# Patient Record
Sex: FEMALE | Race: WHITE | HISPANIC OR LATINO | Employment: UNEMPLOYED | URBAN - METROPOLITAN AREA
[De-identification: names, ages, dates, MRNs, and addresses within clinical notes are randomized per-mention and may not be internally consistent; named-entity substitution may affect disease eponyms.]

---

## 2018-09-12 ENCOUNTER — LAB VISIT (OUTPATIENT)
Dept: LAB | Facility: HOSPITAL | Age: 33
End: 2018-09-12
Attending: OBSTETRICS & GYNECOLOGY
Payer: MEDICAID

## 2018-09-12 ENCOUNTER — OFFICE VISIT (OUTPATIENT)
Dept: OBSTETRICS AND GYNECOLOGY | Facility: CLINIC | Age: 33
End: 2018-09-12
Payer: MEDICAID

## 2018-09-12 VITALS
WEIGHT: 143 LBS | RESPIRATION RATE: 10 BRPM | DIASTOLIC BLOOD PRESSURE: 78 MMHG | BODY MASS INDEX: 27 KG/M2 | HEART RATE: 88 BPM | SYSTOLIC BLOOD PRESSURE: 120 MMHG | HEIGHT: 61 IN

## 2018-09-12 DIAGNOSIS — Z32.01 POSITIVE PREGNANCY TEST: ICD-10-CM

## 2018-09-12 DIAGNOSIS — N91.2 AMENORRHEA: Primary | ICD-10-CM

## 2018-09-12 DIAGNOSIS — Z11.3 SCREEN FOR STD (SEXUALLY TRANSMITTED DISEASE): ICD-10-CM

## 2018-09-12 DIAGNOSIS — Z12.4 CERVICAL CANCER SCREENING: ICD-10-CM

## 2018-09-12 LAB
ABO + RH BLD: NORMAL
ALBUMIN SERPL BCP-MCNC: 3.6 G/DL
ALP SERPL-CCNC: 85 U/L
ALT SERPL W/O P-5'-P-CCNC: 28 U/L
ANION GAP SERPL CALC-SCNC: 10 MMOL/L
AST SERPL-CCNC: 27 U/L
BASOPHILS # BLD AUTO: 0.02 K/UL
BASOPHILS NFR BLD: 0.2 %
BILIRUB SERPL-MCNC: 0.2 MG/DL
BLD GP AB SCN CELLS X3 SERPL QL: NORMAL
BUN SERPL-MCNC: 4 MG/DL
CALCIUM SERPL-MCNC: 9.3 MG/DL
CHLORIDE SERPL-SCNC: 106 MMOL/L
CO2 SERPL-SCNC: 21 MMOL/L
CREAT SERPL-MCNC: 0.7 MG/DL
DIFFERENTIAL METHOD: ABNORMAL
EOSINOPHIL # BLD AUTO: 0.2 K/UL
EOSINOPHIL NFR BLD: 2.1 %
ERYTHROCYTE [DISTWIDTH] IN BLOOD BY AUTOMATED COUNT: 12.1 %
EST. GFR  (AFRICAN AMERICAN): >60 ML/MIN/1.73 M^2
EST. GFR  (NON AFRICAN AMERICAN): >60 ML/MIN/1.73 M^2
GLUCOSE SERPL-MCNC: 105 MG/DL
HCT VFR BLD AUTO: 36 %
HGB BLD-MCNC: 12.4 G/DL
LYMPHOCYTES # BLD AUTO: 2.5 K/UL
LYMPHOCYTES NFR BLD: 25.1 %
MCH RBC QN AUTO: 30 PG
MCHC RBC AUTO-ENTMCNC: 34.4 G/DL
MCV RBC AUTO: 87 FL
MONOCYTES # BLD AUTO: 0.8 K/UL
MONOCYTES NFR BLD: 8.2 %
NEUTROPHILS # BLD AUTO: 6.4 K/UL
NEUTROPHILS NFR BLD: 64.4 %
PLATELET # BLD AUTO: 287 K/UL
PMV BLD AUTO: 9.6 FL
POTASSIUM SERPL-SCNC: 3.3 MMOL/L
PROT SERPL-MCNC: 7.5 G/DL
RBC # BLD AUTO: 4.13 M/UL
SODIUM SERPL-SCNC: 137 MMOL/L
WBC # BLD AUTO: 9.86 K/UL

## 2018-09-12 PROCEDURE — 99203 OFFICE O/P NEW LOW 30 MIN: CPT | Mod: PBBFAC,TH | Performed by: OBSTETRICS & GYNECOLOGY

## 2018-09-12 PROCEDURE — 87340 HEPATITIS B SURFACE AG IA: CPT

## 2018-09-12 PROCEDURE — 87491 CHLMYD TRACH DNA AMP PROBE: CPT

## 2018-09-12 PROCEDURE — 87624 HPV HI-RISK TYP POOLED RSLT: CPT | Mod: 59

## 2018-09-12 PROCEDURE — 81220 CFTR GENE COM VARIANTS: CPT

## 2018-09-12 PROCEDURE — 86592 SYPHILIS TEST NON-TREP QUAL: CPT

## 2018-09-12 PROCEDURE — 99999 PR PBB SHADOW E&M-NEW PATIENT-LVL III: CPT | Mod: PBBFAC,,, | Performed by: OBSTETRICS & GYNECOLOGY

## 2018-09-12 PROCEDURE — 85025 COMPLETE CBC W/AUTO DIFF WBC: CPT

## 2018-09-12 PROCEDURE — 80053 COMPREHEN METABOLIC PANEL: CPT

## 2018-09-12 PROCEDURE — 88175 CYTOPATH C/V AUTO FLUID REDO: CPT

## 2018-09-12 PROCEDURE — 36415 COLL VENOUS BLD VENIPUNCTURE: CPT

## 2018-09-12 PROCEDURE — 86762 RUBELLA ANTIBODY: CPT

## 2018-09-12 PROCEDURE — 86901 BLOOD TYPING SEROLOGIC RH(D): CPT

## 2018-09-12 PROCEDURE — 86703 HIV-1/HIV-2 1 RESULT ANTBDY: CPT

## 2018-09-12 PROCEDURE — 99204 OFFICE O/P NEW MOD 45 MIN: CPT | Mod: 25,S$PBB,TH, | Performed by: OBSTETRICS & GYNECOLOGY

## 2018-09-12 NOTE — PROGRESS NOTES
Subjective:   Patient ID: Royer Mars is a 33 y.o. y.o. female.     Chief Complaint: Missed Menses       History of Present Illness:    Royer presents today complaining of amenorrhea. No LMP recorded.. Prior menstrual cycles have been regular. She reports positive home pregnancy test. UPT is positive.     Patient reports LMP mid May. No n/v. No cramping or vaginal bleeding. No FM perceived yet. She has had two prior uncomplicated SVDs.     History reviewed. No pertinent past medical history.  Past Surgical History:   Procedure Laterality Date    HERNIA REPAIR       Social History     Socioeconomic History    Marital status:      Spouse name: None    Number of children: None    Years of education: None    Highest education level: None   Social Needs    Financial resource strain: None    Food insecurity - worry: None    Food insecurity - inability: None    Transportation needs - medical: None    Transportation needs - non-medical: None   Occupational History    None   Tobacco Use    Smoking status: Never Smoker    Smokeless tobacco: Never Used   Substance and Sexual Activity    Alcohol use: No    Drug use: No    Sexual activity: None   Other Topics Concern    None   Social History Narrative    None     Family History   Problem Relation Age of Onset    Diabetes Mother     Diabetes Father     Heart disease Father     Hypertension Father      OB History    Para Term  AB Living   3 2 2     2   SAB TAB Ectopic Multiple Live Births           2      # Outcome Date GA Lbr Christopher/2nd Weight Sex Delivery Anes PTL Lv   3 Term  40w0d  3.402 kg (7 lb 8 oz) F Vag-Spont   RIGOBERTO   2 Term  40w0d  3.345 kg (7 lb 6 oz) F Vag-Spont   RIGOBERTO   1                      ROS:   Constitutional: Negative for chills, fever and weight loss.   HENT: Negative for congestion, ear discharge, hearing loss and nosebleeds.   Eyes: Negative for blurred vision and double vision.   Respiratory:  Negative for cough, hemoptysis, sputum production and shortness of breath.   Cardiovascular: Negative for chest pain, palpitations and orthopnea.   Gastrointestinal: Negative for abdominal pain, heartburn, nausea and vomiting.   Genitourinary: Negative for dysuria, frequency, hematuria and urgency.   Musculoskeletal: Negative for back pain, myalgias and neck pain.   Skin: Negative for itching and rash.   Neurological: Negative for dizziness, tingling, tremors and headaches.   Endo/Heme/Allergies: Negative for environmental allergies and polydipsia. Does not bruise/bleed easily.   Psychiatric/Behavioral: Negative for depression, hallucinations and substance abuse. The patient is not nervous/anxious.       Objective:   Vital Signs:  Vitals:    09/12/18 1611   BP: 120/78   Pulse: 88   Resp: 10         Physical Exam:  General:  alert, cooperative, appears stated age   Skin:  Skin color, texture, turgor normal. No rashes or lesions   HEENT:  conjunctivae/corneas clear. PERRL.   Neck: supple, trachea midline, no adenopathy or thyromegally   Respiratory:  clear to auscultation bilaterally   Heart:  regular rate and rhythm, S1, S2 normal, no murmur, click, rub or gallop   Breasts:  no discharge, erythema, or tenderness   Abdomen:  normal findings: bowel sounds normal, no masses palpable, no organomegaly and soft, non-tender   Pelvis: External genitalia: normal general appearance  Urinary system: urethral meatus normal, bladder nontender  Vaginal: normal mucosa without prolapse or lesions  Cervix: normal appearance  Uterus: normal size, shape, position  Adnexa: normal size, nontender bilaterally   Extremities: Normal ROM; no edema, no cyanosis   Neurologial: Normal strength and tone. No focal numbness or weakness. Reflexes 2+ and equal.   Psychiatric: normal mood, speech, dress, and thought processes     Assessment:      1. Amenorrhea    2. Positive pregnancy test    3. Cervical cancer screening    4. Screen for STD  (sexually transmitted disease)          Plan:      Amenorrhea  -     US OB/GYN Procedure ([facility]) - Extended List - Future; Future    Positive pregnancy test  -     Comprehensive metabolic panel; Future; Expected date: 09/12/2018  -     Cystic Fibrosis Mutation Panel; Future; Expected date: 09/12/2018  -     RPR; Future; Expected date: 09/12/2018  -     Rubella antibody, IgG; Future; Expected date: 09/12/2018  -     Hepatitis B surface antigen; Future; Expected date: 09/12/2018  -     HIV-1 and HIV-2 antibodies; Future; Expected date: 09/12/2018  -     CBC auto differential; Future; Expected date: 09/12/2018  -     Type & Screen - Ob Profile; Future; Expected date: 09/12/2018    Cervical cancer screening  -     HPV High Risk Genotypes, PCR  -     Liquid-based pap smear, screening    Screen for STD (sexually transmitted disease)  -     C. trachomatis/N. gonorrhoeae by AMP DNA      Dating scan scheduled  Pap  HPV cotesting  PNV  Labs  RTC in 4 weeks.     Patient was counseled today on proper weight gain based on the [place] [employer]'s recommendations based on her pre-pregnancy weight. Discussed foods to avoid in pregnancy (i.e. sushi, fish that are high in mercury, deli meat, and unpasteurized cheeses). Discussed prenatal vitamin options (i.e. stool softener, DHA). She was also counseled on safe, healthy behavior as well as medications safe in pregnancy.

## 2018-09-13 ENCOUNTER — PROCEDURE VISIT (OUTPATIENT)
Dept: OBSTETRICS AND GYNECOLOGY | Facility: CLINIC | Age: 33
End: 2018-09-13
Payer: MEDICAID

## 2018-09-13 DIAGNOSIS — N91.2 AMENORRHEA: ICD-10-CM

## 2018-09-13 LAB
HBV SURFACE AG SERPL QL IA: NEGATIVE
HIV 1+2 AB+HIV1 P24 AG SERPL QL IA: NEGATIVE
RUBV IGG SER-ACNC: 61.7 IU/ML
RUBV IGG SER-IMP: REACTIVE

## 2018-09-13 PROCEDURE — 76815 OB US LIMITED FETUS(S): CPT | Mod: PBBFAC | Performed by: OBSTETRICS & GYNECOLOGY

## 2018-09-13 PROCEDURE — 76815 OB US LIMITED FETUS(S): CPT | Mod: 26,S$PBB,, | Performed by: OBSTETRICS & GYNECOLOGY

## 2018-09-14 LAB — RPR SER QL: NORMAL

## 2018-09-16 LAB
C TRACH DNA SPEC QL NAA+PROBE: NOT DETECTED
N GONORRHOEA DNA SPEC QL NAA+PROBE: NOT DETECTED

## 2018-09-17 LAB — CFTR MUT ANL BLD/T: NORMAL

## 2018-09-18 LAB
HPV HR 12 DNA CVX QL NAA+PROBE: NEGATIVE
HPV16 AG SPEC QL: NEGATIVE
HPV18 DNA SPEC QL NAA+PROBE: NEGATIVE

## 2018-10-04 ENCOUNTER — INITIAL PRENATAL (OUTPATIENT)
Dept: OBSTETRICS AND GYNECOLOGY | Facility: CLINIC | Age: 33
End: 2018-10-04
Payer: MEDICAID

## 2018-10-04 VITALS
SYSTOLIC BLOOD PRESSURE: 120 MMHG | DIASTOLIC BLOOD PRESSURE: 80 MMHG | WEIGHT: 144 LBS | BODY MASS INDEX: 27.21 KG/M2 | HEART RATE: 80 BPM

## 2018-10-04 DIAGNOSIS — Z3A.17 17 WEEKS GESTATION OF PREGNANCY: ICD-10-CM

## 2018-10-04 DIAGNOSIS — Z34.02 SUPERVISION OF NORMAL FIRST PREGNANCY IN SECOND TRIMESTER: Primary | ICD-10-CM

## 2018-10-04 DIAGNOSIS — Z36.3 ENCOUNTER FOR ROUTINE SCREENING FOR MALFORMATION USING ULTRASONICS: ICD-10-CM

## 2018-10-04 PROCEDURE — 99999 PR PBB SHADOW E&M-EST. PATIENT-LVL II: CPT | Mod: PBBFAC,,, | Performed by: OBSTETRICS & GYNECOLOGY

## 2018-10-04 PROCEDURE — 99213 OFFICE O/P EST LOW 20 MIN: CPT | Mod: TH,S$PBB,, | Performed by: OBSTETRICS & GYNECOLOGY

## 2018-10-04 PROCEDURE — 99212 OFFICE O/P EST SF 10 MIN: CPT | Mod: PBBFAC,TH | Performed by: OBSTETRICS & GYNECOLOGY

## 2018-10-04 RX ORDER — ONDANSETRON 4 MG/1
4 TABLET, FILM COATED ORAL EVERY 6 HOURS PRN
Qty: 30 TABLET | Refills: 0 | Status: SHIPPED | OUTPATIENT
Start: 2018-10-04 | End: 2019-10-04

## 2018-10-04 NOTE — PROGRESS NOTES
Reviewed prenatal labs with patient. Reviewed dating criteria. Discussed proper nutrition and weight gain in pregnancy. No vaginal bleeding or cramping noted. SAB precautions discussed.     Patient doing well. No vaginal bleeding or cramping noted. Discussed the need for an anatomy scan between 18-20 weeks. Discussed quad screen; declines. RTC in 2 weeks.      Coffective counseling sheet Get Ready discussed with mother. Reinforced avoiding induction of labor unless medically indicated as well as comfort measures during labor.  Encouraged mother to download Coffective mobile huber if she has not already done so. Mother verbalizes understanding.    Initial ob packet supplied to patient. Contents supplied and discussed include medications safe in pregnancy, pregnancy A to Z, class schedule, pregnancy checklist, car seat safety, and handout on skin to skin and breastfeeding. Coeffective huber card supplied and discussed.

## 2018-10-25 ENCOUNTER — TELEPHONE (OUTPATIENT)
Dept: OBSTETRICS AND GYNECOLOGY | Facility: CLINIC | Age: 33
End: 2018-10-25

## 2018-10-25 NOTE — TELEPHONE ENCOUNTER
----- Message from Slime Campos sent at 10/25/2018  9:19 AM CDT -----  Contact: Kristopher ()  Royer Mars  MRN: [id number]  Home Phone  [phone]  Work Phone  Not on file.  Mobile  [phone]    Patient Care Team:  Patricia Castle NP as PCP - General (Family Medicine)  OB? Yes, 20w5d  What phone number can you be reached at? [phone]  Message: Pt missed appt for ultrasound and MD appt. The pt daughter was ill. Pt would earlier appt than next available appt. Please return call. Thank you.

## 2018-10-26 ENCOUNTER — ROUTINE PRENATAL (OUTPATIENT)
Dept: OBSTETRICS AND GYNECOLOGY | Facility: CLINIC | Age: 33
End: 2018-10-26
Payer: MEDICAID

## 2018-10-26 ENCOUNTER — PROCEDURE VISIT (OUTPATIENT)
Dept: OBSTETRICS AND GYNECOLOGY | Facility: CLINIC | Age: 33
End: 2018-10-26
Payer: MEDICAID

## 2018-10-26 VITALS
HEART RATE: 80 BPM | DIASTOLIC BLOOD PRESSURE: 70 MMHG | WEIGHT: 145.19 LBS | BODY MASS INDEX: 27.44 KG/M2 | SYSTOLIC BLOOD PRESSURE: 110 MMHG

## 2018-10-26 DIAGNOSIS — Z34.02 SUPERVISION OF NORMAL FIRST PREGNANCY IN SECOND TRIMESTER: Primary | ICD-10-CM

## 2018-10-26 DIAGNOSIS — Z36.3 ENCOUNTER FOR ROUTINE SCREENING FOR MALFORMATION USING ULTRASONICS: ICD-10-CM

## 2018-10-26 DIAGNOSIS — Z3A.20 20 WEEKS GESTATION OF PREGNANCY: ICD-10-CM

## 2018-10-26 PROCEDURE — 76805 OB US >/= 14 WKS SNGL FETUS: CPT | Mod: 26,S$PBB,, | Performed by: OBSTETRICS & GYNECOLOGY

## 2018-10-26 PROCEDURE — 76805 OB US >/= 14 WKS SNGL FETUS: CPT | Mod: PBBFAC | Performed by: OBSTETRICS & GYNECOLOGY

## 2018-10-26 PROCEDURE — 99212 OFFICE O/P EST SF 10 MIN: CPT | Mod: PBBFAC,TH | Performed by: OBSTETRICS & GYNECOLOGY

## 2018-10-26 PROCEDURE — 99999 PR PBB SHADOW E&M-EST. PATIENT-LVL II: CPT | Mod: PBBFAC,,, | Performed by: OBSTETRICS & GYNECOLOGY

## 2018-10-26 PROCEDURE — 99213 OFFICE O/P EST LOW 20 MIN: CPT | Mod: TH,S$PBB,, | Performed by: OBSTETRICS & GYNECOLOGY

## 2018-10-26 NOTE — PROGRESS NOTES
Patient with no complaints. Denies vaginal bleeding or cramping.  Patient declined  quad screen. Anatomy scan reviewed and WNL. RTC in 4 weeks    Coffective counseling sheet Fall In Love discussed with mother. Reinforced immediate skin to skin, the magic first hour, importance of the first feeding and delaying routine procedures. Encouraged mother to download Coffective mobile huber if she has not already done so. Mother verbalizes understanding.

## 2018-11-27 ENCOUNTER — ROUTINE PRENATAL (OUTPATIENT)
Dept: OBSTETRICS AND GYNECOLOGY | Facility: CLINIC | Age: 33
End: 2018-11-27
Payer: MEDICAID

## 2018-11-27 VITALS
BODY MASS INDEX: 27.96 KG/M2 | SYSTOLIC BLOOD PRESSURE: 112 MMHG | WEIGHT: 148 LBS | DIASTOLIC BLOOD PRESSURE: 78 MMHG | HEART RATE: 78 BPM

## 2018-11-27 DIAGNOSIS — Z13.1 ENCOUNTER FOR SCREENING FOR DIABETES MELLITUS: ICD-10-CM

## 2018-11-27 DIAGNOSIS — Z34.02 SUPERVISION OF NORMAL FIRST PREGNANCY IN SECOND TRIMESTER: Primary | ICD-10-CM

## 2018-11-27 DIAGNOSIS — Z3A.25 25 WEEKS GESTATION OF PREGNANCY: ICD-10-CM

## 2018-11-27 PROCEDURE — 99212 OFFICE O/P EST SF 10 MIN: CPT | Mod: PBBFAC,TH | Performed by: OBSTETRICS & GYNECOLOGY

## 2018-11-27 PROCEDURE — 99999 PR PBB SHADOW E&M-EST. PATIENT-LVL II: CPT | Mod: PBBFAC,,, | Performed by: OBSTETRICS & GYNECOLOGY

## 2018-11-27 PROCEDURE — 99213 OFFICE O/P EST LOW 20 MIN: CPT | Mod: TH,S$PBB,, | Performed by: OBSTETRICS & GYNECOLOGY

## 2018-11-27 NOTE — PROGRESS NOTES
Patient with no complaints. Denies vaginal bleeding or contractions. Good FM. GTT/CBC ordered today however patient will have to call to schedule appt.   labor precautions discussed with patient. RTC in 2 weeks.     Coffective counseling sheet Nourish discussed with mother. Reinforced basic breastfeeding position and latch as well as proper hand expression technique and avoidance of artificial nipples and formula unless medically indicated. Encouraged mother to download Coffective mobile huber if she has not already done so.  Mother verbalizes understanding.

## 2018-12-27 ENCOUNTER — LAB VISIT (OUTPATIENT)
Dept: LAB | Facility: HOSPITAL | Age: 33
End: 2018-12-27
Attending: OBSTETRICS & GYNECOLOGY
Payer: MEDICAID

## 2018-12-27 ENCOUNTER — ROUTINE PRENATAL (OUTPATIENT)
Dept: OBSTETRICS AND GYNECOLOGY | Facility: CLINIC | Age: 33
End: 2018-12-27
Payer: MEDICAID

## 2018-12-27 VITALS
DIASTOLIC BLOOD PRESSURE: 70 MMHG | BODY MASS INDEX: 28.91 KG/M2 | WEIGHT: 153 LBS | HEART RATE: 80 BPM | SYSTOLIC BLOOD PRESSURE: 110 MMHG

## 2018-12-27 DIAGNOSIS — Z34.83 ENCOUNTER FOR SUPERVISION OF OTHER NORMAL PREGNANCY IN THIRD TRIMESTER: Primary | ICD-10-CM

## 2018-12-27 DIAGNOSIS — Z3A.29 29 WEEKS GESTATION OF PREGNANCY: ICD-10-CM

## 2018-12-27 DIAGNOSIS — Z13.1 ENCOUNTER FOR SCREENING FOR DIABETES MELLITUS: ICD-10-CM

## 2018-12-27 DIAGNOSIS — Z23 NEED FOR TDAP VACCINATION: ICD-10-CM

## 2018-12-27 DIAGNOSIS — Z34.02 SUPERVISION OF NORMAL FIRST PREGNANCY IN SECOND TRIMESTER: ICD-10-CM

## 2018-12-27 PROBLEM — Z34.03 SUPERVISION OF NORMAL FIRST TEEN PREGNANCY IN THIRD TRIMESTER: Status: ACTIVE | Noted: 2018-10-04

## 2018-12-27 PROBLEM — Z34.93 ENCOUNTER FOR SUPERVISION OF NORMAL PREGNANCY IN THIRD TRIMESTER: Status: ACTIVE | Noted: 2018-12-27

## 2018-12-27 PROBLEM — Z34.03 SUPERVISION OF NORMAL FIRST TEEN PREGNANCY IN THIRD TRIMESTER: Status: RESOLVED | Noted: 2018-10-04 | Resolved: 2018-12-27

## 2018-12-27 LAB
BASOPHILS # BLD AUTO: 0.01 K/UL
BASOPHILS NFR BLD: 0.1 %
DIFFERENTIAL METHOD: ABNORMAL
EOSINOPHIL # BLD AUTO: 0.1 K/UL
EOSINOPHIL NFR BLD: 1.5 %
ERYTHROCYTE [DISTWIDTH] IN BLOOD BY AUTOMATED COUNT: 12.7 %
GLUCOSE SERPL-MCNC: 135 MG/DL
HCT VFR BLD AUTO: 35 %
HGB BLD-MCNC: 11.6 G/DL
LYMPHOCYTES # BLD AUTO: 1.8 K/UL
LYMPHOCYTES NFR BLD: 21.1 %
MCH RBC QN AUTO: 28 PG
MCHC RBC AUTO-ENTMCNC: 33.1 G/DL
MCV RBC AUTO: 85 FL
MONOCYTES # BLD AUTO: 0.6 K/UL
MONOCYTES NFR BLD: 6.5 %
NEUTROPHILS # BLD AUTO: 6.1 K/UL
NEUTROPHILS NFR BLD: 70.8 %
PLATELET # BLD AUTO: 267 K/UL
PMV BLD AUTO: 10.6 FL
RBC # BLD AUTO: 4.14 M/UL
WBC # BLD AUTO: 8.59 K/UL

## 2018-12-27 PROCEDURE — 82947 ASSAY GLUCOSE BLOOD QUANT: CPT

## 2018-12-27 PROCEDURE — 99212 OFFICE O/P EST SF 10 MIN: CPT | Mod: PBBFAC,TH | Performed by: OBSTETRICS & GYNECOLOGY

## 2018-12-27 PROCEDURE — 99999 PR PBB SHADOW E&M-EST. PATIENT-LVL II: CPT | Mod: PBBFAC,,, | Performed by: OBSTETRICS & GYNECOLOGY

## 2018-12-27 PROCEDURE — 36415 COLL VENOUS BLD VENIPUNCTURE: CPT

## 2018-12-27 PROCEDURE — 85025 COMPLETE CBC W/AUTO DIFF WBC: CPT

## 2018-12-27 PROCEDURE — 99213 OFFICE O/P EST LOW 20 MIN: CPT | Mod: TH,S$PBB,, | Performed by: OBSTETRICS & GYNECOLOGY

## 2018-12-27 NOTE — PROGRESS NOTES
Patient with no complaints. Denies vaginal bleeding or contractions. Good FM. Tdap discussed and ordered today.  Discussed fetal kick count instructions with patient to monitor fetal movement. RTC in 2 weeks.    Coffective counseling sheet Keep Baby Close discussed with mother. Reinforced rooming in practices, continued skin to skin, and quiet hours as requested by mother.  Encouraged mother to download Coffective mobile huber if she has not already done so. Mother verbalizes understanding.

## 2019-01-10 ENCOUNTER — ROUTINE PRENATAL (OUTPATIENT)
Dept: OBSTETRICS AND GYNECOLOGY | Facility: CLINIC | Age: 34
End: 2019-01-10
Payer: MEDICAID

## 2019-01-10 VITALS
HEART RATE: 80 BPM | BODY MASS INDEX: 29.29 KG/M2 | DIASTOLIC BLOOD PRESSURE: 80 MMHG | WEIGHT: 155 LBS | SYSTOLIC BLOOD PRESSURE: 110 MMHG

## 2019-01-10 DIAGNOSIS — O99.210 OBESITY IN PREGNANCY: ICD-10-CM

## 2019-01-10 DIAGNOSIS — Z3A.31 31 WEEKS GESTATION OF PREGNANCY: ICD-10-CM

## 2019-01-10 DIAGNOSIS — Z34.83 ENCOUNTER FOR SUPERVISION OF OTHER NORMAL PREGNANCY IN THIRD TRIMESTER: Primary | ICD-10-CM

## 2019-01-10 PROCEDURE — 99213 OFFICE O/P EST LOW 20 MIN: CPT | Mod: TH,S$PBB,, | Performed by: OBSTETRICS & GYNECOLOGY

## 2019-01-10 PROCEDURE — 99999 PR PBB SHADOW E&M-EST. PATIENT-LVL II: CPT | Mod: PBBFAC,,, | Performed by: OBSTETRICS & GYNECOLOGY

## 2019-01-10 PROCEDURE — 99212 OFFICE O/P EST SF 10 MIN: CPT | Mod: PBBFAC,TH | Performed by: OBSTETRICS & GYNECOLOGY

## 2019-01-10 PROCEDURE — 99213 PR OFFICE/OUTPT VISIT, EST, LEVL III, 20-29 MIN: ICD-10-PCS | Mod: TH,S$PBB,, | Performed by: OBSTETRICS & GYNECOLOGY

## 2019-01-10 PROCEDURE — 99999 PR PBB SHADOW E&M-EST. PATIENT-LVL II: ICD-10-PCS | Mod: PBBFAC,,, | Performed by: OBSTETRICS & GYNECOLOGY

## 2019-01-10 NOTE — PROGRESS NOTES
Patient with no complaints. Denies vaginal bleeding or contractions. Good FM. Growth scan ordered for next visit.     Coffective Motivation Document discussed with mother. Reinforced benefits of breastfeeding, risk of formula, and cue based feedings. Encouraged mother to download Composerightective mobile huber if she has not already done so. Mother verbalizes understanding.

## 2019-01-25 ENCOUNTER — ROUTINE PRENATAL (OUTPATIENT)
Dept: OBSTETRICS AND GYNECOLOGY | Facility: CLINIC | Age: 34
End: 2019-01-25
Payer: MEDICAID

## 2019-01-25 VITALS
SYSTOLIC BLOOD PRESSURE: 138 MMHG | WEIGHT: 161.63 LBS | HEART RATE: 82 BPM | BODY MASS INDEX: 30.53 KG/M2 | DIASTOLIC BLOOD PRESSURE: 86 MMHG

## 2019-01-25 DIAGNOSIS — Z34.83 ENCOUNTER FOR SUPERVISION OF OTHER NORMAL PREGNANCY, THIRD TRIMESTER: Primary | ICD-10-CM

## 2019-01-25 DIAGNOSIS — Z3A.33 33 WEEKS GESTATION OF PREGNANCY: ICD-10-CM

## 2019-01-25 PROCEDURE — 99212 OFFICE O/P EST SF 10 MIN: CPT | Mod: PBBFAC,TH | Performed by: OBSTETRICS & GYNECOLOGY

## 2019-01-25 PROCEDURE — 99213 OFFICE O/P EST LOW 20 MIN: CPT | Mod: TH,S$PBB,, | Performed by: OBSTETRICS & GYNECOLOGY

## 2019-01-25 PROCEDURE — 99999 PR PBB SHADOW E&M-EST. PATIENT-LVL II: CPT | Mod: PBBFAC,,, | Performed by: OBSTETRICS & GYNECOLOGY

## 2019-01-25 PROCEDURE — 99213 PR OFFICE/OUTPT VISIT, EST, LEVL III, 20-29 MIN: ICD-10-PCS | Mod: TH,S$PBB,, | Performed by: OBSTETRICS & GYNECOLOGY

## 2019-01-25 PROCEDURE — 99999 PR PBB SHADOW E&M-EST. PATIENT-LVL II: ICD-10-PCS | Mod: PBBFAC,,, | Performed by: OBSTETRICS & GYNECOLOGY

## 2019-01-25 NOTE — PROGRESS NOTES
Patient with no complaints. Denies vaginal bleeding or contractions. Good FM. Growth scan declined at this time by patient.     Coffective counseling sheet Build Your Team discussed with mother. Reinforced importance of early identification of support team including champion, OB provider, pediatrician and local community resources. Encouraged mother to download Coffective mobile huber if she has not already done so.  Mother verbalizes understanding. Also discussed quiet time and delayed bathing.

## 2019-02-08 ENCOUNTER — ROUTINE PRENATAL (OUTPATIENT)
Dept: OBSTETRICS AND GYNECOLOGY | Facility: CLINIC | Age: 34
End: 2019-02-08
Payer: MEDICAID

## 2019-02-08 VITALS
HEART RATE: 76 BPM | WEIGHT: 160.38 LBS | BODY MASS INDEX: 30.31 KG/M2 | SYSTOLIC BLOOD PRESSURE: 122 MMHG | DIASTOLIC BLOOD PRESSURE: 76 MMHG

## 2019-02-08 DIAGNOSIS — Z34.83 ENCOUNTER FOR SUPERVISION OF OTHER NORMAL PREGNANCY, THIRD TRIMESTER: Primary | ICD-10-CM

## 2019-02-08 DIAGNOSIS — Z3A.35 35 WEEKS GESTATION OF PREGNANCY: ICD-10-CM

## 2019-02-08 PROCEDURE — 99213 OFFICE O/P EST LOW 20 MIN: CPT | Mod: PBBFAC,TH | Performed by: OBSTETRICS & GYNECOLOGY

## 2019-02-08 PROCEDURE — 87184 SC STD DISK METHOD PER PLATE: CPT

## 2019-02-08 PROCEDURE — 99213 PR OFFICE/OUTPT VISIT, EST, LEVL III, 20-29 MIN: ICD-10-PCS | Mod: TH,S$PBB,, | Performed by: OBSTETRICS & GYNECOLOGY

## 2019-02-08 PROCEDURE — 87147 CULTURE TYPE IMMUNOLOGIC: CPT

## 2019-02-08 PROCEDURE — 87081 CULTURE SCREEN ONLY: CPT

## 2019-02-08 PROCEDURE — 99999 PR PBB SHADOW E&M-EST. PATIENT-LVL III: ICD-10-PCS | Mod: PBBFAC,,, | Performed by: OBSTETRICS & GYNECOLOGY

## 2019-02-08 PROCEDURE — 99999 PR PBB SHADOW E&M-EST. PATIENT-LVL III: CPT | Mod: PBBFAC,,, | Performed by: OBSTETRICS & GYNECOLOGY

## 2019-02-08 PROCEDURE — 99213 OFFICE O/P EST LOW 20 MIN: CPT | Mod: TH,S$PBB,, | Performed by: OBSTETRICS & GYNECOLOGY

## 2019-02-11 LAB — BACTERIA SPEC AEROBE CULT: NORMAL

## 2019-02-13 ENCOUNTER — TELEPHONE (OUTPATIENT)
Dept: OBSTETRICS AND GYNECOLOGY | Facility: CLINIC | Age: 34
End: 2019-02-13

## 2019-02-13 NOTE — TELEPHONE ENCOUNTER
----- Message from Tamiko Moise sent at 2/13/2019  3:09 PM CST -----  Contact: alcon/  Royer Mars  MRN: [id number]  Home Phone      [phone]  Work Phone      Not on file.  Mobile          [phone]    Patient Care Team:  Patricia Castle NP as PCP - General (Family Medicine)  OB? Yes, 36w4d  What phone number can you be reached at? [phone]  Message:  Pt's  is calling in regards to his wifes appt this week. Pt is a routine ob being seen every week. Pt's  states she can't come in anytime this week and I'm not showing any other openings. Please return call.

## 2019-02-28 ENCOUNTER — ANESTHESIA EVENT (OUTPATIENT)
Dept: OBSTETRICS AND GYNECOLOGY | Facility: HOSPITAL | Age: 34
End: 2019-02-28
Payer: MEDICAID

## 2019-02-28 ENCOUNTER — ANESTHESIA (OUTPATIENT)
Dept: OBSTETRICS AND GYNECOLOGY | Facility: HOSPITAL | Age: 34
End: 2019-02-28
Payer: MEDICAID

## 2019-02-28 ENCOUNTER — HOSPITAL ENCOUNTER (INPATIENT)
Facility: HOSPITAL | Age: 34
LOS: 2 days | Discharge: HOME | End: 2019-03-02
Attending: OBSTETRICS & GYNECOLOGY | Admitting: OBSTETRICS & GYNECOLOGY
Payer: MEDICAID

## 2019-02-28 DIAGNOSIS — O14.13 SEVERE PRE-ECLAMPSIA IN THIRD TRIMESTER: Primary | ICD-10-CM

## 2019-02-28 DIAGNOSIS — Z37.9 NORMAL LABOR: ICD-10-CM

## 2019-02-28 PROBLEM — B95.1 POSITIVE GBS TEST: Status: ACTIVE | Noted: 2019-02-28

## 2019-02-28 PROBLEM — O36.8930 MECONIUM IN AMNIOTIC FLUID AFFECTING MANAGEMENT OF MOTHER IN THIRD TRIMESTER: Status: ACTIVE | Noted: 2019-02-28

## 2019-02-28 LAB
ABO + RH BLD: NORMAL
ALBUMIN SERPL BCP-MCNC: 1.7 G/DL
ALBUMIN SERPL BCP-MCNC: 1.7 G/DL
ALP SERPL-CCNC: 640 U/L
ALP SERPL-CCNC: 662 U/L
ALT SERPL W/O P-5'-P-CCNC: 100 U/L
ALT SERPL W/O P-5'-P-CCNC: 92 U/L
ANION GAP SERPL CALC-SCNC: 11 MMOL/L
ANION GAP SERPL CALC-SCNC: 8 MMOL/L
AST SERPL-CCNC: 73 U/L
AST SERPL-CCNC: 77 U/L
BASOPHILS # BLD AUTO: 0.02 K/UL
BASOPHILS # BLD AUTO: 0.03 K/UL
BASOPHILS NFR BLD: 0.2 %
BASOPHILS NFR BLD: 0.3 %
BILIRUB SERPL-MCNC: 0.3 MG/DL
BILIRUB SERPL-MCNC: 0.5 MG/DL
BLD GP AB SCN CELLS X3 SERPL QL: NORMAL
BUN SERPL-MCNC: 8 MG/DL
BUN SERPL-MCNC: 9 MG/DL
CALCIUM SERPL-MCNC: 8.6 MG/DL
CALCIUM SERPL-MCNC: 8.7 MG/DL
CHLORIDE SERPL-SCNC: 105 MMOL/L
CHLORIDE SERPL-SCNC: 107 MMOL/L
CO2 SERPL-SCNC: 19 MMOL/L
CO2 SERPL-SCNC: 22 MMOL/L
CREAT SERPL-MCNC: 0.8 MG/DL
CREAT SERPL-MCNC: 0.9 MG/DL
CREAT UR-MCNC: 9.7 MG/DL
DIFFERENTIAL METHOD: ABNORMAL
DIFFERENTIAL METHOD: ABNORMAL
EOSINOPHIL # BLD AUTO: 0 K/UL
EOSINOPHIL # BLD AUTO: 0.1 K/UL
EOSINOPHIL NFR BLD: 0.2 %
EOSINOPHIL NFR BLD: 1.1 %
ERYTHROCYTE [DISTWIDTH] IN BLOOD BY AUTOMATED COUNT: 16.4 %
ERYTHROCYTE [DISTWIDTH] IN BLOOD BY AUTOMATED COUNT: 16.5 %
EST. GFR  (AFRICAN AMERICAN): >60 ML/MIN/1.73 M^2
EST. GFR  (AFRICAN AMERICAN): >60 ML/MIN/1.73 M^2
EST. GFR  (NON AFRICAN AMERICAN): >60 ML/MIN/1.73 M^2
EST. GFR  (NON AFRICAN AMERICAN): >60 ML/MIN/1.73 M^2
GLUCOSE SERPL-MCNC: 114 MG/DL
GLUCOSE SERPL-MCNC: 127 MG/DL
HCT VFR BLD AUTO: 32.3 %
HCT VFR BLD AUTO: 35.2 %
HGB BLD-MCNC: 10.2 G/DL
HGB BLD-MCNC: 11.1 G/DL
LDH SERPL L TO P-CCNC: 365 U/L
LYMPHOCYTES # BLD AUTO: 1.6 K/UL
LYMPHOCYTES # BLD AUTO: 2.3 K/UL
LYMPHOCYTES NFR BLD: 12.8 %
LYMPHOCYTES NFR BLD: 21 %
MAGNESIUM SERPL-MCNC: 6 MG/DL
MCH RBC QN AUTO: 25.9 PG
MCH RBC QN AUTO: 26.2 PG
MCHC RBC AUTO-ENTMCNC: 31.5 G/DL
MCHC RBC AUTO-ENTMCNC: 31.6 G/DL
MCV RBC AUTO: 82 FL
MCV RBC AUTO: 83 FL
MONOCYTES # BLD AUTO: 0.9 K/UL
MONOCYTES # BLD AUTO: 1.2 K/UL
MONOCYTES NFR BLD: 7.9 %
MONOCYTES NFR BLD: 9 %
NEUTROPHILS # BLD AUTO: 10 K/UL
NEUTROPHILS # BLD AUTO: 7.6 K/UL
NEUTROPHILS NFR BLD: 69.7 %
NEUTROPHILS NFR BLD: 77.8 %
PLATELET # BLD AUTO: 205 K/UL
PLATELET # BLD AUTO: 223 K/UL
PMV BLD AUTO: 13.1 FL
PMV BLD AUTO: 13.1 FL
POTASSIUM SERPL-SCNC: 3.6 MMOL/L
POTASSIUM SERPL-SCNC: 4.3 MMOL/L
PROT SERPL-MCNC: 5.7 G/DL
PROT SERPL-MCNC: 5.7 G/DL
PROT UR-MCNC: 65 MG/DL
PROT/CREAT UR: 6.7 MG/G{CREAT}
RBC # BLD AUTO: 3.89 M/UL
RBC # BLD AUTO: 4.28 M/UL
SODIUM SERPL-SCNC: 135 MMOL/L
SODIUM SERPL-SCNC: 137 MMOL/L
URATE SERPL-MCNC: 5.5 MG/DL
WBC # BLD AUTO: 10.96 K/UL
WBC # BLD AUTO: 12.79 K/UL

## 2019-02-28 PROCEDURE — 82570 ASSAY OF URINE CREATININE: CPT

## 2019-02-28 PROCEDURE — 11000001 HC ACUTE MED/SURG PRIVATE ROOM

## 2019-02-28 PROCEDURE — 84550 ASSAY OF BLOOD/URIC ACID: CPT

## 2019-02-28 PROCEDURE — 83735 ASSAY OF MAGNESIUM: CPT

## 2019-02-28 PROCEDURE — 80053 COMPREHEN METABOLIC PANEL: CPT

## 2019-02-28 PROCEDURE — 01967 NEURAXL LBR ANES VAG DLVR: CPT | Mod: QZ | Performed by: NURSE ANESTHETIST, CERTIFIED REGISTERED

## 2019-02-28 PROCEDURE — 99211 OFF/OP EST MAY X REQ PHY/QHP: CPT | Mod: TH

## 2019-02-28 PROCEDURE — 83615 LACTATE (LD) (LDH) ENZYME: CPT

## 2019-02-28 PROCEDURE — 25000003 PHARM REV CODE 250: Performed by: OBSTETRICS & GYNECOLOGY

## 2019-02-28 PROCEDURE — 36415 COLL VENOUS BLD VENIPUNCTURE: CPT

## 2019-02-28 PROCEDURE — 86901 BLOOD TYPING SEROLOGIC RH(D): CPT

## 2019-02-28 PROCEDURE — 86592 SYPHILIS TEST NON-TREP QUAL: CPT

## 2019-02-28 PROCEDURE — 72100002 HC LABOR CARE, 1ST 8 HOURS

## 2019-02-28 PROCEDURE — 27000492 HC SLEEVE, SCD T/L

## 2019-02-28 PROCEDURE — 72200004 HC VAGINAL DELIVERY LEVEL I

## 2019-02-28 PROCEDURE — 59409 PR OBSTETRICAL CARE,VAG DELIV ONLY: ICD-10-PCS | Mod: AT,,, | Performed by: OBSTETRICS & GYNECOLOGY

## 2019-02-28 PROCEDURE — 63600175 PHARM REV CODE 636 W HCPCS: Performed by: OBSTETRICS & GYNECOLOGY

## 2019-02-28 PROCEDURE — 59409 OBSTETRICAL CARE: CPT | Mod: AT,,, | Performed by: OBSTETRICS & GYNECOLOGY

## 2019-02-28 PROCEDURE — 25000003 PHARM REV CODE 250: Performed by: NURSE ANESTHETIST, CERTIFIED REGISTERED

## 2019-02-28 PROCEDURE — 85025 COMPLETE CBC W/AUTO DIFF WBC: CPT | Mod: 91

## 2019-02-28 PROCEDURE — 62326 NJX INTERLAMINAR LMBR/SAC: CPT | Performed by: NURSE ANESTHETIST, CERTIFIED REGISTERED

## 2019-02-28 RX ORDER — SODIUM CHLORIDE, SODIUM LACTATE, POTASSIUM CHLORIDE, CALCIUM CHLORIDE 600; 310; 30; 20 MG/100ML; MG/100ML; MG/100ML; MG/100ML
1000 INJECTION, SOLUTION INTRAVENOUS CONTINUOUS
Status: ACTIVE | OUTPATIENT
Start: 2019-02-28 | End: 2019-02-28

## 2019-02-28 RX ORDER — MISOPROSTOL 200 UG/1
600 TABLET ORAL
Status: DISCONTINUED | OUTPATIENT
Start: 2019-02-28 | End: 2019-02-28

## 2019-02-28 RX ORDER — ONDANSETRON 4 MG/1
8 TABLET, ORALLY DISINTEGRATING ORAL EVERY 8 HOURS PRN
Status: DISCONTINUED | OUTPATIENT
Start: 2019-02-28 | End: 2019-03-02 | Stop reason: HOSPADM

## 2019-02-28 RX ORDER — HYDROCODONE BITARTRATE AND ACETAMINOPHEN 5; 325 MG/1; MG/1
1 TABLET ORAL EVERY 4 HOURS PRN
Status: DISCONTINUED | OUTPATIENT
Start: 2019-02-28 | End: 2019-03-02 | Stop reason: HOSPADM

## 2019-02-28 RX ORDER — ACETAMINOPHEN 325 MG/1
650 TABLET ORAL EVERY 6 HOURS PRN
Status: DISCONTINUED | OUTPATIENT
Start: 2019-02-28 | End: 2019-03-02 | Stop reason: HOSPADM

## 2019-02-28 RX ORDER — ONDANSETRON 4 MG/1
8 TABLET, ORALLY DISINTEGRATING ORAL EVERY 8 HOURS PRN
Status: DISCONTINUED | OUTPATIENT
Start: 2019-02-28 | End: 2019-02-28

## 2019-02-28 RX ORDER — LABETALOL HYDROCHLORIDE 5 MG/ML
20 INJECTION, SOLUTION INTRAVENOUS ONCE AS NEEDED
Status: DISCONTINUED | OUTPATIENT
Start: 2019-02-28 | End: 2019-03-02 | Stop reason: HOSPADM

## 2019-02-28 RX ORDER — SODIUM CHLORIDE, SODIUM LACTATE, POTASSIUM CHLORIDE, CALCIUM CHLORIDE 600; 310; 30; 20 MG/100ML; MG/100ML; MG/100ML; MG/100ML
INJECTION, SOLUTION INTRAVENOUS CONTINUOUS
Status: DISCONTINUED | OUTPATIENT
Start: 2019-02-28 | End: 2019-02-28

## 2019-02-28 RX ORDER — OXYTOCIN/RINGER'S LACTATE 20/1000 ML
333 PLASTIC BAG, INJECTION (ML) INTRAVENOUS CONTINUOUS
Status: DISCONTINUED | OUTPATIENT
Start: 2019-02-28 | End: 2019-02-28

## 2019-02-28 RX ORDER — DIPHENHYDRAMINE HCL 25 MG
25 CAPSULE ORAL EVERY 4 HOURS PRN
Status: DISCONTINUED | OUTPATIENT
Start: 2019-02-28 | End: 2019-03-02 | Stop reason: HOSPADM

## 2019-02-28 RX ORDER — LABETALOL HYDROCHLORIDE 5 MG/ML
40 INJECTION, SOLUTION INTRAVENOUS ONCE AS NEEDED
Status: DISCONTINUED | OUTPATIENT
Start: 2019-02-28 | End: 2019-03-02 | Stop reason: HOSPADM

## 2019-02-28 RX ORDER — DIPHENHYDRAMINE HYDROCHLORIDE 50 MG/ML
25 INJECTION INTRAMUSCULAR; INTRAVENOUS EVERY 4 HOURS PRN
Status: DISCONTINUED | OUTPATIENT
Start: 2019-02-28 | End: 2019-03-02 | Stop reason: HOSPADM

## 2019-02-28 RX ORDER — HYDRALAZINE HYDROCHLORIDE 20 MG/ML
10 INJECTION INTRAMUSCULAR; INTRAVENOUS ONCE
Status: COMPLETED | OUTPATIENT
Start: 2019-02-28 | End: 2019-02-28

## 2019-02-28 RX ORDER — LABETALOL HYDROCHLORIDE 5 MG/ML
20 INJECTION, SOLUTION INTRAVENOUS ONCE AS NEEDED
Status: COMPLETED | OUTPATIENT
Start: 2019-02-28 | End: 2019-02-28

## 2019-02-28 RX ORDER — LABETALOL HYDROCHLORIDE 5 MG/ML
20 INJECTION, SOLUTION INTRAVENOUS ONCE
Status: COMPLETED | OUTPATIENT
Start: 2019-02-28 | End: 2019-02-28

## 2019-02-28 RX ORDER — OXYTOCIN/RINGER'S LACTATE 20/1000 ML
41.7 PLASTIC BAG, INJECTION (ML) INTRAVENOUS CONTINUOUS
Status: DISCONTINUED | OUTPATIENT
Start: 2019-02-28 | End: 2019-02-28

## 2019-02-28 RX ORDER — BUTORPHANOL TARTRATE 2 MG/ML
2 INJECTION INTRAMUSCULAR; INTRAVENOUS EVERY 4 HOURS PRN
Status: DISCONTINUED | OUTPATIENT
Start: 2019-02-28 | End: 2019-02-28

## 2019-02-28 RX ORDER — MAGNESIUM SULFATE HEPTAHYDRATE 40 MG/ML
1 INJECTION, SOLUTION INTRAVENOUS CONTINUOUS
Status: DISCONTINUED | OUTPATIENT
Start: 2019-02-28 | End: 2019-03-01

## 2019-02-28 RX ORDER — CALCIUM GLUCONATE 98 MG/ML
1 INJECTION, SOLUTION INTRAVENOUS
Status: DISCONTINUED | OUTPATIENT
Start: 2019-02-28 | End: 2019-03-02 | Stop reason: HOSPADM

## 2019-02-28 RX ORDER — HYDRALAZINE HYDROCHLORIDE 20 MG/ML
10 INJECTION INTRAMUSCULAR; INTRAVENOUS ONCE AS NEEDED
Status: COMPLETED | OUTPATIENT
Start: 2019-02-28 | End: 2019-03-01

## 2019-02-28 RX ORDER — IBUPROFEN 600 MG/1
600 TABLET ORAL EVERY 6 HOURS PRN
Status: DISCONTINUED | OUTPATIENT
Start: 2019-02-28 | End: 2019-03-02 | Stop reason: HOSPADM

## 2019-02-28 RX ORDER — LABETALOL HYDROCHLORIDE 5 MG/ML
80 INJECTION, SOLUTION INTRAVENOUS ONCE AS NEEDED
Status: DISCONTINUED | OUTPATIENT
Start: 2019-02-28 | End: 2019-03-02 | Stop reason: HOSPADM

## 2019-02-28 RX ORDER — SODIUM CHLORIDE 9 MG/ML
INJECTION, SOLUTION INTRAVENOUS
Status: DISCONTINUED | OUTPATIENT
Start: 2019-02-28 | End: 2019-02-28

## 2019-02-28 RX ORDER — LIDOCAINE HCL/EPINEPHRINE/PF 2%-1:200K
VIAL (ML) INJECTION
Status: DISCONTINUED | OUTPATIENT
Start: 2019-02-28 | End: 2019-02-28

## 2019-02-28 RX ORDER — MAGNESIUM SULFATE HEPTAHYDRATE 40 MG/ML
4 INJECTION, SOLUTION INTRAVENOUS ONCE
Status: COMPLETED | OUTPATIENT
Start: 2019-02-28 | End: 2019-02-28

## 2019-02-28 RX ADMIN — HYDRALAZINE HYDROCHLORIDE 10 MG: 20 INJECTION INTRAMUSCULAR; INTRAVENOUS at 07:02

## 2019-02-28 RX ADMIN — SODIUM CHLORIDE, SODIUM LACTATE, POTASSIUM CHLORIDE, AND CALCIUM CHLORIDE 1000 ML: .6; .31; .03; .02 INJECTION, SOLUTION INTRAVENOUS at 12:02

## 2019-02-28 RX ADMIN — LABETALOL HYDROCHLORIDE 20 MG: 5 INJECTION, SOLUTION INTRAVENOUS at 01:02

## 2019-02-28 RX ADMIN — MAGNESIUM SULFATE HEPTAHYDRATE 4 G: 40 INJECTION, SOLUTION INTRAVENOUS at 12:02

## 2019-02-28 RX ADMIN — MAGNESIUM SULFATE HEPTAHYDRATE 2 G/HR: 40 INJECTION, SOLUTION INTRAVENOUS at 12:02

## 2019-02-28 RX ADMIN — DEXTROSE 5 MILLION UNITS: 50 INJECTION, SOLUTION INTRAVENOUS at 04:02

## 2019-02-28 RX ADMIN — Medication 12 ML/HR: at 06:02

## 2019-02-28 RX ADMIN — SODIUM CHLORIDE, SODIUM LACTATE, POTASSIUM CHLORIDE, AND CALCIUM CHLORIDE 1000 ML: .6; .31; .03; .02 INJECTION, SOLUTION INTRAVENOUS at 05:02

## 2019-02-28 RX ADMIN — SODIUM CHLORIDE, SODIUM LACTATE, POTASSIUM CHLORIDE, AND CALCIUM CHLORIDE: .6; .31; .03; .02 INJECTION, SOLUTION INTRAVENOUS at 05:02

## 2019-02-28 RX ADMIN — LIDOCAINE HYDROCHLORIDE,EPINEPHRINE BITARTRATE 5 ML: 20; .005 INJECTION, SOLUTION EPIDURAL; INFILTRATION; INTRACAUDAL; PERINEURAL at 06:02

## 2019-02-28 RX ADMIN — LABETALOL HYDROCHLORIDE 20 MG: 5 INJECTION, SOLUTION INTRAVENOUS at 12:02

## 2019-02-28 RX ADMIN — IBUPROFEN 600 MG: 600 TABLET ORAL at 10:02

## 2019-02-28 RX ADMIN — Medication 333 MILLI-UNITS/MIN: at 08:02

## 2019-02-28 RX ADMIN — SODIUM CHLORIDE, SODIUM LACTATE, POTASSIUM CHLORIDE, AND CALCIUM CHLORIDE 1000 ML: .6; .31; .03; .02 INJECTION, SOLUTION INTRAVENOUS at 04:02

## 2019-02-28 NOTE — NURSING
Dr. Mendes in department.  MD ok with pt not having catheter if we can accurately measure output.

## 2019-02-28 NOTE — ANESTHESIA PROCEDURE NOTES
Epidural    Patient location during procedure: OB   Reason for block: primary anesthetic   Diagnosis: labor pain   Start time: 2/28/2019 6:03 AM  Timeout: 2/28/2019 6:02 AM  End time: 2/28/2019 6:05 AM  Surgery related to: pregnacy  Staffing  Resident/CRNA: Rhett Nelson CRNA  Performed: resident/CRNA   Preanesthetic Checklist  Completed: patient identified, site marked, surgical consent, pre-op evaluation, timeout performed, IV checked, risks and benefits discussed, monitors and equipment checked, anesthesia consent given, hand hygiene performed and patient being monitored  Preparation  Patient position: sitting  Prep: alcohol  Patient monitoring: ECG, Pulse Ox, continuous capnometry and Blood Pressure  Epidural  Skin Anesthetic: lidocaine 1%  Skin Wheal: 5 mL  Administration type: continuous  Approach: midline  Interspace: L3-4    Injection technique: NIKHIL saline  Needle and Epidural Catheter  Needle type: Tuohy   Needle gauge: 18  Needle length: 5.0 inches  Needle insertion depth: 7 cm  Catheter type: multi-orifice  Catheter size: 20 G  Catheter at skin depth: 12 cm  Test dose: 3 mL of lidocaine 1.5% with Epi 1-to-200,000  Additional Documentation: incremental injection, negative aspiration for heme and CSF, no paresthesia on injection, no signs/symptoms of IV or SA injection, no significant pain on injection and no significant complaints from patient  Needle localization: anatomical landmarks  Assessment  Upper dermatomal levels - Left: T6  Right: T6   Dermatomal levels determined by alcohol wipe  Ease of block: easy  Patient's tolerance of the procedure: comfortable throughout block and no complaintsNo inadvertent dural puncture with Tuohy.  Dural puncture performed with spinal needle.

## 2019-02-28 NOTE — PLAN OF CARE
Problem: Breastfeeding  Goal: Effective Breastfeeding  Outcome: Ongoing (interventions implemented as appropriate)  Worked with mother for first breastfeeding post delivery. Baby latched well. Mom with no significant risk factors.  other 2 children exclusively for 1 year each. Does not have breastpump @ home. Options discussed. Mom does use WIC. Peer counselor referral made for [place] of [place]. Breastfeeding guide given and explained. Dad speaks English well. Mom understands as evidence by her trying to answer before dad interprets. Mom speaks minimal english.

## 2019-02-28 NOTE — NURSING
"Dr. Mendes called.  Notified that pt is refusing medications for BPs or interventions.  Pt stated that "she feels good and would like to wait and see if BPs go down."  Explained to pt the risks of not treating, lab results, and also why we need to treat her.  MD states she will be over to explain treatment plan  "

## 2019-02-28 NOTE — HOSPITAL COURSE
Pt admitted to L&D in active labor with cervical change quickly from 6 to 8 cm.  She received an epidural for pain control.  PCN for GBS prophylaxis.  AROM performed with thick meconium noted.   Delivered by .  Soon after delivery, severe range BP noted.  Pre-e labs obtained and notable for elevated liver enzymes.  Pt also with headache.  Diagnosed with severe pre-eclampsia.  Patient and family hesitant of modern medicine but ultimately agreed to proceed with Magnesium sulfate for treatment and seizure prophylaxis.  IV antihypertensives have been required for BP control.   PPD#1 Magnesium continued through the night with good diuresis.  Magnesium discontinued at 8 am.    Postpartum day 2.  Patient doing well. Blood pressure is under good control with Procardia.  Patient's pain is under control and would like to be discharged home

## 2019-02-28 NOTE — ASSESSMENT & PLAN NOTE
Admitted for labor management.  Admit labs    Patient cleared for Anesthesia: Epidural    Anesthesia/Surgery risks, benefits, and alternative options discussed and understood by patient/fa

## 2019-02-28 NOTE — L&D DELIVERY NOTE
[facility]  Vaginal Delivery   Operative Note    SUMMARY     Normal spontaneous vaginal delivery of live infant, skin to skin was unable to be performed due to thick meconium; baby assessment done at warmer and then brought back immediately to skin to skin.  Infant delivered position CYRIL over intact perineum.  Nuchal cord: No.    Spontaneous delivery of placenta and IV pitocin given noting good uterine tone.  No lacerations noted.  Patient tolerated delivery well. Sponge needle and lap counted correctly x2.    Infant APGARS 8,9    Indications:  (normal spontaneous vaginal delivery)  Pregnancy complicated by:   Patient Active Problem List   Diagnosis    Encounter for supervision of normal pregnancy in third trimester    Normal labor    Positive GBS test     (normal spontaneous vaginal delivery)    Meconium in amniotic fluid affecting management of mother in third trimester     Admitting GA: 38w5d    Delivery Information for  Girl Royer Mars    Birth information:  YOB: 2019   Time of birth: 8:06 AM   Sex: female   Head Delivery Date/Time: 2019  8:06 AM   Delivery type: Vaginal, Spontaneous   Gestational Age: 38w5d    Delivery Providers    Delivering clinician:  Rommel Mendes MD   Provider Role    Shwetha Benítez Registered Nurse    Karina Vazquezhill Surgical Tech            Measurements    Weight:    Length:           Apgars    Living status:    Apgars:   1 min.:   5 min.:   10 min.:   15 min.:   20 min.:     Skin color:          Heart rate:          Reflex irritability:          Muscle tone:          Respiratory effort:          Total:                 Operative Delivery    Forceps attempted?:  No  Vacuum extractor attempted?:  No         Shoulder Dystocia    Shoulder dystocia present?:  No           Presentation    Presentation:  Vertex  Position:  Middle Occiput Anterior           Interventions/Resuscitation         Cord    Vessels:  3  vessels  Complications:  None  Delayed Cord Clamping?:  No  Cord Clamped Date/Time:  2019  8:07 AM  Cord Blood Disposition:  Lab  Gases Sent?:  No  Stem Cell Collection (by MD):  No       Placenta    Placenta delivery date/time:  2019 0810  Placenta removal:  Spontaneous  Placenta appearance:  Intact  Placenta disposition:  discarded           Labor Events:       labor: No     Labor Onset Date/Time:         Dilation Complete Date/Time:         Start Pushing Date/Time: 2019 07:15     Rupture Date/Time:              Rupture type:           Fluid Amount:        Fluid Color:        Fluid Odor:        Membrane Status (PeriCalm): ARM (Artificial Rupture)      Rupture Date/Time (PeriCalm): 2019 06:13:00      Fluid Amount (PeriCalm): Small      Fluid Color (PeriCalm): Meconium Thick       steroids: None     Antibiotics given for GBS: Yes     Induction:       Indications for induction:        Augmentation: amniotomy     Indications for augmentation:       Labor complications: None     Additional complications:          Cervical ripening:                     Delivery:      Episiotomy: None     Indication for Episiotomy:       Perineal Lacerations: None Repaired:      Periurethral Laceration: none Repaired:     Labial Laceration: none Repaired:     Sulcus Laceration: none Repaired:     Vaginal Laceration:   Repaired:     Cervical Laceration:   Repaired:     Repair suture: None     Repair # of packets:       Vaginal delivery QBL (mL): 150      QBL (mL): 0     Combined Blood Loss (mL): 150     Vaginal Sweep Performed:       Surgicount Correct: Yes       Other providers:       Anesthesia    Method:  Epidural          Details (if applicable):  Trial of Labor      Categorization:      Priority:     Indications for :     Incision Type:       Additional  information:  Forceps:    Vacuum:    Breech:    Observed anomalies    Other (Comments): Maternal  temp at delivery 97.1  Thick meconium

## 2019-02-28 NOTE — PLAN OF CARE
02/28/19 1056   Discharge Assessment   Assessment Type Discharge Planning Assessment   Assessment information obtained from? Medical Record   Expected Length of Stay (days) 2   Communicated expected length of stay with patient/caregiver yes   Prior to hospitilization cognitive status: Alert/Oriented   Prior to hospitalization functional status: Independent   Facility Arrived From: home   Lives With child(graham), dependent;spouse   Able to Return to Prior Arrangements yes   Is patient able to care for self after discharge? Yes   Who are your caregiver(s) and their phone number(s)? spouse [phone]   Patient's perception of discharge disposition home or selfcare   Readmission Within the Last 30 Days no previous admission in last 30 days   Patient currently being followed by outpatient case management? No   Patient currently receives any other outside agency services? No   Equipment Currently Used at Home none   Does the patient have transportation home? Yes   Transportation Anticipated family or friend will provide   Dialysis Name and Scheduled days n/a   Does the patient receive services at the Coumadin Clinic? No   Discharge Plan A Home   Discharge Plan B Home   DME Needed Upon Discharge  none   Patient/Family in Agreement with Plan yes         Patient admitted for delivery of baby. NO CM needs or concerns identified at this time for discharge. CM will continue to follow and assist as needed.

## 2019-02-28 NOTE — ANESTHESIA POSTPROCEDURE EVALUATION
"Anesthesia Post Evaluation    Patient: Royer Mars    Procedure(s) Performed: * No procedures listed *    Final Anesthesia Type: epidural  Patient location during evaluation: PACU  Patient participation: Yes- Able to Participate  Level of consciousness: awake and alert, oriented and awake  Post-procedure vital signs: reviewed and stable  Pain management: adequate  Airway patency: patent  PONV status at discharge: No PONV  Anesthetic complications: no      Cardiovascular status: blood pressure returned to baseline, hemodynamically stable and stable  Respiratory status: unassisted, spontaneous ventilation and room air  Hydration status: euvolemic  Follow-up not needed.        Visit Vitals  /68   Pulse 86   Temp 36.2 °C (97.2 °F) (Oral)   Resp 18   Ht 4' 11.84" (1.52 m)   Wt 72.6 kg (160 lb)   LMP 01/05/2018 (Exact Date)   SpO2 99%   Breastfeeding? No   BMI 31.41 kg/m²       Pain/Aditya Score: No Data Recorded      "

## 2019-02-28 NOTE — NURSING
Dr. Mendes at bedside to discuss POC with patient. Pt agreed to POC and MD acuna for pt to receive medications once back from bathroom.

## 2019-02-28 NOTE — NURSING
"RN at bedside to give second dose of labetalol per MD order.  Pt refused second dose.  Explained to pt the risks of refusing medication.  Pt stated that "she is worried because last year my brother  due to low blood pressures."  Explained to pt that her BPs are in severe range and that if we do not treat them and they stay high that pt is at risk for serious complications.  Pt v/u and accepted receiving the second dose and if any other medication needs to be given, pt would like to talk to MD.  Second dose of labetalol given.  "

## 2019-02-28 NOTE — HPI
Pt is a  @ 38 5/7 WGA who presented to L&D in active labor with complaints of contractions.  No LOF. Normal FM.

## 2019-02-28 NOTE — LACTATION NOTE
This note was copied from a baby's chart.     02/28/19 0900   Maternal Information   Date of Referral 02/28/19   Person Making Referral nurse   Infant Reason for Referral other (see comments)  (routine visit)   Maternal Assessment   Breast Size Issue none   Breast Shape Bilateral:;tubular;angled;wide   Breast Density Bilateral:;soft   Areola Bilateral:;elastic   Nipples Bilateral:;everted;graspable   Maternal Infant Feeding   Maternal Preparation hand hygiene   Maternal Emotional State independent;relaxed   Infant Positioning laid back (ventral)   Signs of Milk Transfer audible swallow;infant jaw motion present   Pain with Feeding no   Nipple Shape After Feeding, Left everted   Nipple Shape After Feeding, Right everted   Latch Assistance yes   Equipment Type   Breast Pump Type manual;other (see comments)  (to be given @ DC prn engorgement)   Lactation Referrals   Lactation Referrals outpatient lactation program;support group;WIC (women, infants and children) program;other (see comments)  (peer counselor referral- [place])   Outpatient Lactation Program Lactation Follow-up Date/Time discussed clinic options   Support Group Lactation Follow-up Date/Time 2019 flyer   WIC Lactation Follow-up Date/Time mom to call ASAP after DC     Routine visit completed. Breast appear wide spaced and tubular. Mom denies breast changes. Did successfully breastfeed exclusively for 1 year each of other children. Assessed first feeding immediately after birth. Education provided on latch, positioning,milk transfer and importance of baby led feeding on cue (8 or more times daily) and use of hand expression. LATCH score complete. Baby did well both sides. Reviewed the risk associated with use of pacifiers and reasons to avoid artificial nipples. Encouraged mother to use [place] Breastfeeding Guide to track feedings and output. Questions/ Concerns answered. Mother verbalizes understanding.   Used [place] Breastfeeding Guide and reviewed first  alert form, importance/ benefits of exclusive breastfeeding for 6 months, proper handling and storage of breast milk, and all resources available after leaving the hospital. Questions/ Concerns answered. Mother verbalized understanding.

## 2019-02-28 NOTE — ANESTHESIA PREPROCEDURE EVALUATION
02/28/2019  Royer Mars is a 33 y.o., female.    Pre-op Assessment    I have reviewed the Patient Summary Reports.     I have reviewed the Nursing Notes.   I have reviewed the Medications.     Review of Systems  Anesthesia Hx:  No problems with previous Anesthesia  History of prior surgery of interest to airway management or planning:  Denies Personal Hx of Anesthesia complications.   Social:  Non-Smoker, No Alcohol Use    Hematology/Oncology:  Hematology Normal   Oncology Normal     EENT/Dental:EENT/Dental Normal   Cardiovascular:  Cardiovascular Normal Exercise tolerance: good     Pulmonary:  Pulmonary Normal    Renal/:  Renal/ Normal     Hepatic/GI:  Hepatic/GI Normal    Musculoskeletal:  Musculoskeletal Normal    Neurological:  Neurology Normal    Endocrine:  Endocrine Normal    Dermatological:  Skin Normal    Psych:  Psychiatric Normal           Physical Exam  General:  Obesity    Airway/Jaw/Neck:  Airway Findings: Mouth Opening: Normal Tongue: Normal  General Airway Assessment: Adult  Mallampati: II  TM Distance: Normal, at least 6 cm      Dental:  Dental Findings: In tact        Mental Status:  Mental Status Findings:  Cooperative, Alert and Oriented         Anesthesia Plan  Type of Anesthesia, risks & benefits discussed:  Anesthesia Type:  epidural  Patient's Preference:   Intra-op Monitoring Plan: standard ASA monitors  Intra-op Monitoring Plan Comments:   Post Op Pain Control Plan: multimodal analgesia  Post Op Pain Control Plan Comments:   Induction:    Beta Blocker:  Patient is not currently on a Beta-Blocker (No further documentation required).       Informed Consent: Patient understands risks and agrees with Anesthesia plan.  Questions answered. Anesthesia consent signed with patient.  ASA Score: 2     Day of Surgery Review of History & Physical: I have interviewed and examined the  patient. I have reviewed the patient's H&P dated: 2/28/19. There are no significant changes.  H&P update referred to the surgeon.     Anesthesia Plan Notes:  [id number]

## 2019-02-28 NOTE — H&P
[facility]  Obstetrics  History & Physical    Patient Name: Royer Mars  MRN: [id number]  Admission Date: 2019  Primary Care Provider: Patricia Castle NP    Subjective:     Principal Problem:Normal labor    History of Present Illness:  Pt is a  @ 38 5/7 WGA who presented to L&D in active labor with complaints of contractions.  No LOF. Normal FM.     Obstetric HPI:  Patient reports contractions, active fetal movement, No vaginal bleeding , No loss of fluid     This pregnancy has been complicated by GBS positive    Obstetric History       T2      L2     SAB0   TAB0   Ectopic0   Multiple0   Live Births2       # Outcome Date GA Lbr Christopher/2nd Weight Sex Delivery Anes PTL Lv   4 Current            3 Term  40w0d  3.402 kg (7 lb 8 oz) F Vag-Spont   RIGOBETRO   2 Term  40w0d  3.345 kg (7 lb 6 oz) F Vag-Spont   RIGOBERTO   1                  History reviewed. No pertinent past medical history.  Past Surgical History:   Procedure Laterality Date    HERNIA REPAIR         PTA Medications   Medication Sig    prenatal 113/iron/lmfol/omeg3s (PRENATAL 113-IRON-MFOLAT-OMEG3 ORAL) Take by mouth.    ondansetron (ZOFRAN) 4 MG tablet Take 1 tablet (4 mg total) by mouth every 6 (six) hours as needed for Nausea.       Review of patient's allergies indicates:  No Known Allergies     Family History     Problem Relation (Age of Onset)    Diabetes Mother, Father    Heart disease Father    Hypertension Father        Tobacco Use    Smoking status: Never Smoker    Smokeless tobacco: Never Used   Substance and Sexual Activity    Alcohol use: No    Drug use: No    Sexual activity: Not on file     Review of Systems   Constitutional: Negative for activity change, appetite change, chills, diaphoresis, fatigue and fever.   Eyes: Negative for visual disturbance.   Respiratory: Negative for cough, shortness of breath and wheezing.    Cardiovascular: Negative for chest pain and palpitations.    Gastrointestinal: Negative for abdominal pain, constipation, diarrhea, nausea and vomiting.   Genitourinary: Positive for pelvic pain (contractions). Negative for dysuria, genital sores, urgency, vaginal bleeding, vaginal discharge, vaginal pain and vaginal odor.   Musculoskeletal: Negative for back pain, joint swelling and myalgias.   Integumentary:  Negative for rash.   Neurological: Negative for seizures, syncope, numbness and headaches.   Hematological: Negative for adenopathy. Does not bruise/bleed easily.   Psychiatric/Behavioral: Negative for depression. The patient is not nervous/anxious.       Objective:     Vital Signs (Most Recent):  Temp: 97.2 °F (36.2 °C) (02/28/19 0430)  Pulse: (!) 127 (02/28/19 0430)  Resp: 18 (02/28/19 0430)  BP: (!) 182/138(pt in pain, not able to sit still) (02/28/19 0430) Vital Signs (24h Range):  Temp:  [97.2 °F (36.2 °C)] 97.2 °F (36.2 °C)  Pulse:  [] 127  Resp:  [18] 18  BP: (174-182)/() 182/138     Weight: 72.6 kg (160 lb)  Body mass index is 31.41 kg/m².    FHT: 150 Cat/ min - moderate/+ acceleration/no decelerations  TOCO:  Q 2-4 minutes    Physical Exam:   Constitutional: She is oriented to person, place, and time. She appears well-developed and well-nourished. No distress.    HENT:   Head: Normocephalic and atraumatic.    Eyes: Conjunctivae and EOM are normal.    Neck: Normal range of motion. Neck supple.     Pulmonary/Chest: Effort normal.        Abdominal:   Gravid, palpable contractions             Musculoskeletal: Normal range of motion.       Neurological: She is alert and oriented to person, place, and time.    Skin: Skin is warm and dry. No rash noted. No erythema.    Psychiatric: She has a normal mood and affect. Her behavior is normal. Judgment and thought content normal.       Cervix:  Dilation:  8  Effacement:  100%  Station: 1  Presentation: Vertex     Significant Labs:  Lab Results   Component Value Date    [facility] A POS 02/28/2019    HEPBSAG  Negative 2018    STREPBCULT  2019     STREPTOCOCCUS AGALACTIAE (GROUP B)  Beta-hemolytic streptococci are routinely susceptible to   penicillins,cephalosporins and carbapenems.         CBC:   Recent Labs   Lab 19  0417   WBC 10.96   RBC 4.28   HGB 11.1*   HCT 35.2*      MCV 82   MCH 25.9*   MCHC 31.5*     Assessment/Plan:     33 y.o. female  at 38w5d for:    * Normal labor    Admitted for labor management.  Admit labs    Patient cleared for Anesthesia: Epidural    Anesthesia/Surgery risks, benefits, and alternative options discussed and understood by patient/fa       Positive GBS test    PCN for prophylaxis         Rommel Mendes MD  Obstetrics  [facility]

## 2019-02-28 NOTE — SUBJECTIVE & OBJECTIVE
Obstetric HPI:  Patient reports contractions, active fetal movement, No vaginal bleeding , No loss of fluid     This pregnancy has been complicated by GBS positive    Obstetric History       T2      L2     SAB0   TAB0   Ectopic0   Multiple0   Live Births2       # Outcome Date GA Lbr Christopher/2nd Weight Sex Delivery Anes PTL Lv   4 Current            3 Term  40w0d  3.402 kg (7 lb 8 oz) F Vag-Spont   RIGOBERTO   2 Term  40w0d  3.345 kg (7 lb 6 oz) F Vag-Spont   RIGOBERTO   1                  History reviewed. No pertinent past medical history.  Past Surgical History:   Procedure Laterality Date    HERNIA REPAIR         PTA Medications   Medication Sig    prenatal 113/iron/lmfol/omeg3s (PRENATAL 113-IRON-MFOLAT-OMEG3 ORAL) Take by mouth.    ondansetron (ZOFRAN) 4 MG tablet Take 1 tablet (4 mg total) by mouth every 6 (six) hours as needed for Nausea.       Review of patient's allergies indicates:  No Known Allergies     Family History     Problem Relation (Age of Onset)    Diabetes Mother, Father    Heart disease Father    Hypertension Father        Tobacco Use    Smoking status: Never Smoker    Smokeless tobacco: Never Used   Substance and Sexual Activity    Alcohol use: No    Drug use: No    Sexual activity: Not on file     Review of Systems   Constitutional: Negative for activity change, appetite change, chills, diaphoresis, fatigue and fever.   Eyes: Negative for visual disturbance.   Respiratory: Negative for cough, shortness of breath and wheezing.    Cardiovascular: Negative for chest pain and palpitations.   Gastrointestinal: Negative for abdominal pain, constipation, diarrhea, nausea and vomiting.   Genitourinary: Positive for pelvic pain (contractions). Negative for dysuria, genital sores, urgency, vaginal bleeding, vaginal discharge, vaginal pain and vaginal odor.   Musculoskeletal: Negative for back pain, joint swelling and myalgias.   Integumentary:  Negative for rash.   Neurological:  Negative for seizures, syncope, numbness and headaches.   Hematological: Negative for adenopathy. Does not bruise/bleed easily.   Psychiatric/Behavioral: Negative for depression. The patient is not nervous/anxious.       Objective:     Vital Signs (Most Recent):  Temp: 97.2 °F (36.2 °C) (02/28/19 0430)  Pulse: (!) 127 (02/28/19 0430)  Resp: 18 (02/28/19 0430)  BP: (!) 182/138(pt in pain, not able to sit still) (02/28/19 0430) Vital Signs (24h Range):  Temp:  [97.2 °F (36.2 °C)] 97.2 °F (36.2 °C)  Pulse:  [] 127  Resp:  [18] 18  BP: (174-182)/() 182/138     Weight: 72.6 kg (160 lb)  Body mass index is 31.41 kg/m².    FHT: 150 Cat/ min - moderate/+ acceleration/no decelerations  TOCO:  Q 2-4 minutes    Physical Exam:   Constitutional: She is oriented to person, place, and time. She appears well-developed and well-nourished. No distress.    HENT:   Head: Normocephalic and atraumatic.    Eyes: Conjunctivae and EOM are normal.    Neck: Normal range of motion. Neck supple.     Pulmonary/Chest: Effort normal.        Abdominal:   Gravid, palpable contractions             Musculoskeletal: Normal range of motion.       Neurological: She is alert and oriented to person, place, and time.    Skin: Skin is warm and dry. No rash noted. No erythema.    Psychiatric: She has a normal mood and affect. Her behavior is normal. Judgment and thought content normal.       Cervix:  Dilation:  8  Effacement:  100%  Station: 1  Presentation: Vertex     Significant Labs:  Lab Results   Component Value Date    [facility] A POS 02/28/2019    HEPBSAG Negative 09/12/2018    STREPBCULT  02/08/2019     STREPTOCOCCUS AGALACTIAE (GROUP B)  Beta-hemolytic streptococci are routinely susceptible to   penicillins,cephalosporins and carbapenems.         CBC:   Recent Labs   Lab 02/28/19 0417   WBC 10.96   RBC 4.28   HGB 11.1*   HCT 35.2*      MCV 82   MCH 25.9*   MCHC 31.5*

## 2019-03-01 LAB
MAGNESIUM SERPL-MCNC: 5.3 MG/DL
MAGNESIUM SERPL-MCNC: 6.8 MG/DL
MAGNESIUM SERPL-MCNC: 8 MG/DL
RPR SER QL: NORMAL

## 2019-03-01 PROCEDURE — 83735 ASSAY OF MAGNESIUM: CPT | Mod: 91

## 2019-03-01 PROCEDURE — 99232 PR SUBSEQUENT HOSPITAL CARE,LEVL II: ICD-10-PCS | Mod: ,,, | Performed by: OBSTETRICS & GYNECOLOGY

## 2019-03-01 PROCEDURE — 83735 ASSAY OF MAGNESIUM: CPT

## 2019-03-01 PROCEDURE — 11000001 HC ACUTE MED/SURG PRIVATE ROOM

## 2019-03-01 PROCEDURE — 25000003 PHARM REV CODE 250: Performed by: OBSTETRICS & GYNECOLOGY

## 2019-03-01 PROCEDURE — 63600175 PHARM REV CODE 636 W HCPCS: Performed by: OBSTETRICS & GYNECOLOGY

## 2019-03-01 PROCEDURE — 36415 COLL VENOUS BLD VENIPUNCTURE: CPT

## 2019-03-01 PROCEDURE — 99232 SBSQ HOSP IP/OBS MODERATE 35: CPT | Mod: ,,, | Performed by: OBSTETRICS & GYNECOLOGY

## 2019-03-01 RX ORDER — NIFEDIPINE 30 MG/1
30 TABLET, EXTENDED RELEASE ORAL DAILY
Status: DISCONTINUED | OUTPATIENT
Start: 2019-03-01 | End: 2019-03-02 | Stop reason: HOSPADM

## 2019-03-01 RX ADMIN — MAGNESIUM SULFATE HEPTAHYDRATE 2 G/HR: 40 INJECTION, SOLUTION INTRAVENOUS at 06:03

## 2019-03-01 RX ADMIN — HYDRALAZINE HYDROCHLORIDE 10 MG: 20 INJECTION INTRAMUSCULAR; INTRAVENOUS at 12:03

## 2019-03-01 RX ADMIN — NIFEDIPINE 30 MG: 30 TABLET, FILM COATED, EXTENDED RELEASE ORAL at 11:03

## 2019-03-01 NOTE — PROGRESS NOTES
02/28/19 2308   Vital Signs   Pulse 83   Heart Rate Source NIBP   BP (!) 166/99   MAP (mmHg) 115   breastfeeding, will continue to monitor. Reset blood pressure to cycle every 15 minutes. Educated the patient and significant other that her blood pressure is elevated and the medication helped decrease the pressure earlier, and that the patient would benefit from the medication for her blood pressure at this time. The spouse requested to monitor the pressure and not to give the medication for the elevated blood pressure at this time. The spouse and patient were both reeducated on the risk of the elevated blood pressure (risk of seizures/strokes as discussed with both earlier by Dr. Mendes.)  Patient and spouse verbalize understanding. Will continue to monitor.

## 2019-03-01 NOTE — PROGRESS NOTES
19 2230   Vital Signs   Pulse 86   Heart Rate Source NIBP   BP (!) 163/92   MAP (mmHg) 111   Breastfeeding  at this time, will reassess once she is finished feeding the baby.

## 2019-03-01 NOTE — LACTATION NOTE
This note was copied from a baby's chart.  Mother/father has requested use of formula, education provided on the risk of formula feeding and risk of supplementation when breastfeeding. Listened to mothers concerns, honored mothers request. Education provided on alternative feeding methods/ formula feeding. Questions/ Concerns answered. Mother verbalized understanding.

## 2019-03-01 NOTE — SUBJECTIVE & OBJECTIVE
Hospital course: Pt admitted to L&D in active labor with cervical change quickly from 6 to 8 cm.  She received an epidural for pain control.  PCN for GBS prophylaxis.  AROM performed with thick meconium noted.   Delivered by .  Soon after delivery, severe range BP noted.  Pre-e labs obtained and notable for elevated liver enzymes.  Pt also with headache.  Diagnosed with severe pre-eclampsia.  Patient and family hesitant of modern medicine but ultimately agreed to proceed with Magnesium sulfate for treatment and seizure prophylaxis.  IV antihypertensives have been required for BP control.   PPD#1 Magnesium continued through the night with good diuresis.  Magnesium discontinued at 8 am.      Interval History:     She is doing well this morning. She is tolerating a regular diet without nausea or vomiting. She is voiding spontaneously. She is ambulating. She has passed flatus, and has not a BM. Vaginal bleeding is mild. She denies fever or chills. Abdominal pain is mild and controlled with oral medications. She is breastfeeding. She desires circumcision for her male baby: not applicable.    Objective:     Vital Signs (Most Recent):  Temp: 96.5 °F (35.8 °C) (19 0707)  Pulse: 76 (19 0758)  Resp: 18 (19 0758)  BP: (!) 155/86 (19 0758)  SpO2: 100 % (19 0756) Vital Signs (24h Range):  Temp:  [96.5 °F (35.8 °C)-98.7 °F (37.1 °C)] 96.5 °F (35.8 °C)  Pulse:  [65-97] 76  Resp:  [16-18] 18  SpO2:  [97 %-100 %] 100 %  BP: (113-181)/() 155/86     Weight: 72.6 kg (160 lb)  Body mass index is 31.41 kg/m².      Intake/Output Summary (Last 24 hours) at 3/1/2019 0934  Last data filed at 3/1/2019 0800  Gross per 24 hour   Intake 1979.59 ml   Output 6650 ml   Net -4670.41 ml       Significant Labs:  Lab Results   Component Value Date    [facility] A POS 2019    HEPBSAG Negative 2018    STREPBCULT  2019     STREPTOCOCCUS AGALACTIAE (GROUP B)  Beta-hemolytic streptococci are routinely  susceptible to   penicillins,cephalosporins and carbapenems.       Recent Labs   Lab 02/28/19  1039   HGB 10.2*   HCT 32.3*       CBC:   Recent Labs   Lab 02/28/19  1039   WBC 12.79*   RBC 3.89*   HGB 10.2*   HCT 32.3*      MCV 83   MCH 26.2*   MCHC 31.6*     CMP:   Recent Labs   Lab 02/28/19  1813   *   CALCIUM 8.7   ALBUMIN 1.7*   PROT 5.7*   *   K 3.6   CO2 19*      BUN 9   CREATININE 0.9   ALKPHOS 640*   ALT 92*   AST 73*   BILITOT 0.3     Migdalia/Creat Ratio:   Recent Labs   Lab 02/28/19  1853   UTPCR 6.70*         Physical Exam:   Constitutional: She is oriented to person, place, and time. She appears well-developed and well-nourished. No distress.    HENT:   Head: Normocephalic and atraumatic.    Eyes: Conjunctivae and EOM are normal.    Neck: Normal range of motion. Neck supple.    Cardiovascular: Normal rate, regular rhythm and normal heart sounds.     Pulmonary/Chest: Effort normal and breath sounds normal. No respiratory distress. She has no wheezes. She exhibits no tenderness.        Abdominal: Soft. She exhibits no distension. There is no tenderness. There is no rebound and no guarding.   Fundus firm below umbilicus     Genitourinary:   Genitourinary Comments: Lochia minimal           Musculoskeletal: Normal range of motion. She exhibits edema (+1 but with significant improvement from yesterday). She exhibits no tenderness.       Neurological: She is alert and oriented to person, place, and time. She displays abnormal reflex (1+ bilateral lower extremeties).    Skin: Skin is warm and dry.    Psychiatric: She has a normal mood and affect. Her behavior is normal. Judgment and thought content normal.

## 2019-03-01 NOTE — PROGRESS NOTES
Patient's blood pressures are began to creep up.  My preference would be to start the patient on blood pressure medication.  I have been pre warned by Dr. Mendes at the family was opposed to much medication.  I had discussion with the  who speaks English well and there desire at the moment is not to take medication for blood pressure.  Risk and benefits were explained and the risk of seizures was discussed.  An  was brought on line to make sure that is the patient's decision.  The patient states she will discuss it further with her  and decide if she wants to take blood pressure medication.  Preeclampsia and its ramifications were discussed with the patient

## 2019-03-01 NOTE — NURSING
Pt states that she needs to void.  Pt also stating that she feels weak and dizzy.  Bed pan brought to room and offered to pt and explained to pt that we would rather her use the bedpan for fall precautions.  Pt still refused bedpan and stated that she would like to still walk to the bathroom.  Notified MD that pt is refusing bedpan.  No new orders given.

## 2019-03-01 NOTE — PROGRESS NOTES
I spoke with patient and her  about her diagnosis and my concern about her elevated blood pressures and their hesitation with taking any blood pressure medication. Risks of declining treatment of elevated blood pressure including stroke and death discussed with pt and .  Discussed that patient's persistent headaches could be from elevated blood pressure as well as well as from Magnesium sulfate.  Patient's current blood pressure of 178/100 was reviewed and my concerns were expressed about this being considered a severe range blood pressure with recommendations for treatment.  They have agreed to treatment with Hydralazine IV x 1 dose at this time.   We discussed treatment of Magnesium sulfate is necessary for seizure prophylaxis and will help with treatment of patient's disease. They have requested that the Magnesium be discontinued at 12 hours after start time.  I have agreed to stop the Magnesium sulfate at 1245 am if they still wish to do so at that time.

## 2019-03-01 NOTE — NURSING
Called and updated Deepika in L&D on current telemetry status (see documentation) including peaked T waves.

## 2019-03-01 NOTE — NURSING
Dr. Mendes called the unit at this time to check the status of the patient, updated her on the patient's most current blood pressure, the medication administration, the mag level of 6.8. No new orders at this time.

## 2019-03-01 NOTE — NURSING
Lab called to report magnesium level of 5.3, Dr. Rondon notified, MD states to discontinue magnesium levels.

## 2019-03-01 NOTE — NURSING
Pt still c/o HA.  Pt offered medication again but pt refuses. Discussed with pt that if given tylenol and her pain with her HA would decrease and possibly decrease her BP.  Also explained that she is having HA more than likely due to her increased BP.  Pt still denies wanting medication.

## 2019-03-01 NOTE — PROGRESS NOTES
03/01/19 0015   Vital Signs   Pulse 73   Heart Rate Source NIBP   BP (!) 168/84   MAP (mmHg) 106   I discussed administering hydralazine due to continued elevated b/p with patient and significant other. The patient and significant other asked to take the magnesium drip off as they discussed earlier with Dr. Mendes. The patient and significant other both educated on the importance of the magnesium drip and the Hydralazine due to the elevated blood pressures, to decrease the risk for seizures and strokes. The patient agreed to Hydralazine and to continue receiving the Magnesium at this time. Will continue to monitor patient at this time.

## 2019-03-01 NOTE — PROGRESS NOTES
03/01/19 0001   Vital Signs   Pulse 68   Heart Rate Source NIBP   SpO2 98 %   BP (!) 161/90   MAP (mmHg) 108   discussed with patient and significant other elevated b/p and indication for medication. Patient and significant other request to continue to monitor b/p.

## 2019-03-01 NOTE — NURSING
Lab called to report critical mag level of 8.0, I called Dr. Mendes, notified her of magnesium level of 8.0 new order to decrease the magnesium infusion to 1.5grams/hr, and to educate the patient and spouse that the plan is to continue to decrease the magnesium to wean off, and that Dr. Mendes is on her way and will be by to see the patient. Updated the patient and family, verbalizes understanding.

## 2019-03-01 NOTE — NURSING
Educated the patient and significant other on the importance of continuing the magnesium infusion. The magnesium bag was almost empty and I told the patient I was going to hang another bag. The patient states she wants the infusion stopped. I educated both the patient and significant other on preeclampsia, by providing the pre-e handout and the handout translated in Danish. I reviewed the handout with both patient and significant other, they verbalized understanding and agreed to continue the magnesium at this time.

## 2019-03-01 NOTE — NURSING
Mother has requested use of formula, education provided on the risk of formula feeding and risk of supplementation when breastfeeding. Listened to mothers concerns, honored mothers request. Education provided on alternative feeding methods/ formula feeding. Questions/ Concerns answered. Mother and significant other verbalized understanding. Formula provided, educated on finger feeding, and spoon feeding, assistance provided to father for feeding, return demonstration provided by father of .

## 2019-03-01 NOTE — ASSESSMENT & PLAN NOTE
S/p Magnesium sulfate  S/p multiple IV antihypertensives post-delivery.  BPs are stable at this time.    Pt and family hesitant to be on PB medications, so will monitor closely.  Discussed possible need for short term oral antihypertensive medication such as procardia if BP does not improve.  They may be agreeable to this.

## 2019-03-01 NOTE — PROGRESS NOTES
[facility]  Obstetrics  Postpartum Progress Note    Patient Name: Royer Mars  MRN: [id number]  Admission Date: 2019  Hospital Length of Stay: 1 days  Attending Physician: Rommel Mendes MD  Primary Care Provider: Patricia Castle NP    Subjective:     Principal Problem: (normal spontaneous vaginal delivery)    Hospital course: Pt admitted to L&D in active labor with cervical change quickly from 6 to 8 cm.  She received an epidural for pain control.  PCN for GBS prophylaxis.  AROM performed with thick meconium noted.   Delivered by .  Soon after delivery, severe range BP noted.  Pre-e labs obtained and notable for elevated liver enzymes.  Pt also with headache.  Diagnosed with severe pre-eclampsia.  Patient and family hesitant of modern medicine but ultimately agreed to proceed with Magnesium sulfate for treatment and seizure prophylaxis.  IV antihypertensives have been required for BP control.   PPD#1 Magnesium continued through the night with good diuresis.  Magnesium discontinued at 8 am.      Interval History:     She is doing well this morning. She is tolerating a regular diet without nausea or vomiting. She is voiding spontaneously. She is ambulating. She has passed flatus, and has not a BM. Vaginal bleeding is mild. She denies fever or chills. Abdominal pain is mild and controlled with oral medications. She is breastfeeding. She desires circumcision for her male baby: not applicable.    Objective:     Vital Signs (Most Recent):  Temp: 96.5 °F (35.8 °C) (19 0707)  Pulse: 76 (19 0758)  Resp: 18 (19 0758)  BP: (!) 155/86 (19 0758)  SpO2: 100 % (19 0756) Vital Signs (24h Range):  Temp:  [96.5 °F (35.8 °C)-98.7 °F (37.1 °C)] 96.5 °F (35.8 °C)  Pulse:  [65-97] 76  Resp:  [16-18] 18  SpO2:  [97 %-100 %] 100 %  BP: (113-181)/() 155/86     Weight: 72.6 kg (160 lb)  Body mass index is 31.41 kg/m².      Intake/Output Summary (Last 24 hours) at  3/1/2019 0934  Last data filed at 3/1/2019 0800  Gross per 24 hour   Intake 1979.59 ml   Output 6650 ml   Net -4670.41 ml       Significant Labs:  Lab Results   Component Value Date    GROUPTRH A POS 02/28/2019    HEPBSAG Negative 09/12/2018    STREPBCULT  02/08/2019     STREPTOCOCCUS AGALACTIAE (GROUP B)  Beta-hemolytic streptococci are routinely susceptible to   penicillins,cephalosporins and carbapenems.       Recent Labs   Lab 02/28/19  1039   HGB 10.2*   HCT 32.3*       CBC:   Recent Labs   Lab 02/28/19  1039   WBC 12.79*   RBC 3.89*   HGB 10.2*   HCT 32.3*      MCV 83   MCH 26.2*   MCHC 31.6*     CMP:   Recent Labs   Lab 02/28/19  1813   *   CALCIUM 8.7   ALBUMIN 1.7*   PROT 5.7*   *   K 3.6   CO2 19*      BUN 9   CREATININE 0.9   ALKPHOS 640*   ALT 92*   AST 73*   BILITOT 0.3     Migdalia/Creat Ratio:   Recent Labs   Lab 02/28/19  1853   UTPCR 6.70*         Physical Exam:   Constitutional: She is oriented to person, place, and time. She appears well-developed and well-nourished. No distress.    HENT:   Head: Normocephalic and atraumatic.    Eyes: Conjunctivae and EOM are normal.    Neck: Normal range of motion. Neck supple.    Cardiovascular: Normal rate, regular rhythm and normal heart sounds.     Pulmonary/Chest: Effort normal and breath sounds normal. No respiratory distress. She has no wheezes. She exhibits no tenderness.        Abdominal: Soft. She exhibits no distension. There is no tenderness. There is no rebound and no guarding.   Fundus firm below umbilicus     Genitourinary:   Genitourinary Comments: Lochia minimal           Musculoskeletal: Normal range of motion. She exhibits edema (+1 but with significant improvement from yesterday). She exhibits no tenderness.       Neurological: She is alert and oriented to person, place, and time. She displays abnormal reflex (1+ bilateral lower extremeties).    Skin: Skin is warm and dry.    Psychiatric: She has a normal mood and affect.  Her behavior is normal. Judgment and thought content normal.       Assessment/Plan:     33 y.o. female  for:    *  (normal spontaneous vaginal delivery)    Continue routine pp care     Severe pre-eclampsia, delivered, current hospitalization    S/p Magnesium sulfate  S/p multiple IV antihypertensives post-delivery.  BPs are stable at this time.    Pt and family hesitant to be on PB medications, so will monitor closely.  Discussed possible need for short term oral antihypertensive medication such as procardia if BP does not improve.  They may be agreeable to this.      Positive GBS test    PCN for prophylaxis     Normal labor    Admitted for labor management.  Admit labs    Patient cleared for Anesthesia: Epidural    Anesthesia/Surgery risks, benefits, and alternative options discussed and understood by patient/fa           Disposition: As patient meets milestones, will plan to discharge pending BP control.    Rommel Mendes MD  Obstetrics  [facility]

## 2019-03-02 VITALS
DIASTOLIC BLOOD PRESSURE: 83 MMHG | HEART RATE: 107 BPM | TEMPERATURE: 99 F | RESPIRATION RATE: 18 BRPM | BODY MASS INDEX: 31.41 KG/M2 | SYSTOLIC BLOOD PRESSURE: 136 MMHG | HEIGHT: 60 IN | OXYGEN SATURATION: 100 % | WEIGHT: 160 LBS

## 2019-03-02 PROCEDURE — 25000003 PHARM REV CODE 250: Performed by: OBSTETRICS & GYNECOLOGY

## 2019-03-02 PROCEDURE — 99238 PR HOSPITAL DISCHARGE DAY,<30 MIN: ICD-10-PCS | Mod: ,,, | Performed by: OBSTETRICS & GYNECOLOGY

## 2019-03-02 PROCEDURE — 99238 HOSP IP/OBS DSCHRG MGMT 30/<: CPT | Mod: ,,, | Performed by: OBSTETRICS & GYNECOLOGY

## 2019-03-02 RX ORDER — IBUPROFEN 600 MG/1
600 TABLET ORAL EVERY 6 HOURS PRN
Qty: 20 TABLET | Refills: 2 | Status: SHIPPED | OUTPATIENT
Start: 2019-03-02

## 2019-03-02 RX ORDER — NIFEDIPINE 30 MG/1
30 TABLET, EXTENDED RELEASE ORAL DAILY
Qty: 30 TABLET | Refills: 11 | Status: SHIPPED | OUTPATIENT
Start: 2019-03-03 | End: 2020-03-02

## 2019-03-02 RX ADMIN — NIFEDIPINE 30 MG: 30 TABLET, FILM COATED, EXTENDED RELEASE ORAL at 08:03

## 2019-03-02 NOTE — PROGRESS NOTES
Pt meets discharge criteria. Pt is free of pain. Bleeding light, fundus firm. VSS. Pt wheeled downstairs upon discharge. No needs or questions.    Pt meets discharge criteria. Pt is free of pain. Bleeding light, fundus firm. VSS. Pt wheeled downstairs upon discharge. No needs or questions.    Formula Feeding Guide given and explained. Handouts included in the guide are as follows: Safe Bottle Feeding, WIC- Let your Baby Set the Pace for Bottle Feeding,  Formula Feeding Record, WISE- Formula Feeding, Managing Non-nursing Engorgement, Community Resources, & Baby Feeding Cues (signs). Instructed to feed on demand/cue, 8 or more times in 24 hours, utilizing paced bottle feeding technique. Feed baby until fullness cues observed. Questions/Concerns answered. Mother verbalized and demonstrated understanding.

## 2019-03-02 NOTE — DISCHARGE INSTRUCTIONS
Postpartum Discharge Instructions:    · No heavy lifting, straining, frequent rest periods  · Pelvic rest--no douching, tampons, or intercourse until released by MD  · Talk to your doctor about birth control--remember breastfeeding is not a method of birth control  · Notify MD if bleeding becomes heavier than usual and if large clots, painful cramping,or foul odor develops.   Vaginal discharge will lighten and decrease in amount gradually.    · Cleanse perineal area from front to back after urination or having a bowel movement.  · Tub soak or portable sitz bath at home.  Apply clean pad with Epifoam and Tucks to perineal area.  · Episiotomy stitches will dissolve within 2-3 weeks  · If not breastfeeding, wear tight fitting sports bra for 1 week--remove only to bathe  · Remember to keep your breast clean and dry to prevent any cracking  · Notify MD if breast become reddened,swollen, nipples bleed or crack, or fever greater than 100.4  · Notify MD of pain, swelling,  Redness, or heat developing in back of leg especially when foot is flexed toward body  · Notify MD of pain not relieved by pain medication.  · Call MD or go to ER for any concerns

## 2019-03-02 NOTE — DISCHARGE SUMMARY
[facility]  Obstetrics  Discharge Summary      Patient Name: Royer Mars  MRN: [id number]  Admission Date: 2019  Hospital Length of Stay: 2 days  Discharge Date and Time:  2019 9:11 AM  Attending Physician: Rommel Mendes MD   Discharging Provider: Brian Rondon MD  Primary Care Provider: Patricia Castle NP    HPI: Pt is a  @ 38 5/7 WGA who presented to L&D in active labor with complaints of contractions.  No LOF. Normal FM.     * No surgery found *     Hospital Course:   Pt admitted to L&D in active labor with cervical change quickly from 6 to 8 cm.  She received an epidural for pain control.  PCN for GBS prophylaxis.  AROM performed with thick meconium noted.   Delivered by .  Soon after delivery, severe range BP noted.  Pre-e labs obtained and notable for elevated liver enzymes.  Pt also with headache.  Diagnosed with severe pre-eclampsia.  Patient and family hesitant of modern medicine but ultimately agreed to proceed with Magnesium sulfate for treatment and seizure prophylaxis.  IV antihypertensives have been required for BP control.   PPD#1 Magnesium continued through the night with good diuresis.  Magnesium discontinued at 8 am.    Postpartum day 2.  Patient doing well. Blood pressure is under good control with Procardia.  Patient's pain is under control and would like to be discharged home        Final Active Diagnoses:    Diagnosis Date Noted POA    PRINCIPAL PROBLEM:   (normal spontaneous vaginal delivery) [O80] 2019 Not Applicable    Severe pre-eclampsia, delivered, current hospitalization [O14.14] 2019 No    Normal labor [O80, Z37.9] 2019 Not Applicable    Positive GBS test [B95.1] 2019 Yes    Meconium in amniotic fluid affecting management of mother in third trimester [O36.8930] 2019 Yes      Problems Resolved During this Admission:            Feeding Method: breast    Immunizations     Date Immunization Status  Dose Route/Site Given by    03/02/19 0829 Tdap Deferred 0.5 mL Intramuscular/Left deltoid Deepika Merlos, RN          Delivery:    Episiotomy: None   Lacerations: None   Repair suture: None   Repair # of packets:     Blood loss (ml): 150     Birth information:  YOB: 2019   Time of birth: 8:06 AM   Sex: female   Delivery type: Vaginal, Spontaneous   Gestational Age: 38w5d    Delivery Clinician:      Other providers:       Additional  information:  Forceps:    Vacuum:    Breech:    Observed anomalies      Living?:           APGARS  One minute Five minutes Ten minutes   Skin color:         Heart rate:         Grimace:         Muscle tone:         Breathing:         Totals: 8  9        Placenta: Delivered:       appearance    Pending Diagnostic Studies:     None          Discharged Condition: good    Disposition:  home    Follow Up:  1 week    Patient Instructions:   No discharge procedures on file.  Medications:  Current Discharge Medication List      CONTINUE these medications which have NOT CHANGED    Details   prenatal 113/iron/lmfol/omeg3s (PRENATAL 113-IRON-MFOLAT-OMEG3 ORAL) Take by mouth.      ondansetron (ZOFRAN) 4 MG tablet Take 1 tablet (4 mg total) by mouth every 6 (six) hours as needed for Nausea.  Qty: 30 tablet, Refills: 0             Brian Rondon MD  Obstetrics  [facility]

## 2019-03-04 NOTE — PLAN OF CARE
03/04/19 0851   Final Note   Assessment Type Final Discharge Note   Anticipated Discharge Disposition Home   What phone number can be called within the next 1-3 days to see how you are doing after discharge? [phone]   Hospital Follow Up  Appt(s) scheduled? Yes   Discharge plans and expectations educations in teach back method with documentation complete? Yes

## 2019-03-07 ENCOUNTER — POSTPARTUM VISIT (OUTPATIENT)
Dept: OBSTETRICS AND GYNECOLOGY | Facility: CLINIC | Age: 34
End: 2019-03-07
Payer: MEDICAID

## 2019-03-07 VITALS
BODY MASS INDEX: 28.22 KG/M2 | HEART RATE: 82 BPM | SYSTOLIC BLOOD PRESSURE: 130 MMHG | HEIGHT: 59 IN | RESPIRATION RATE: 10 BRPM | DIASTOLIC BLOOD PRESSURE: 90 MMHG | WEIGHT: 140 LBS

## 2019-03-07 DIAGNOSIS — Z01.30 BP CHECK: ICD-10-CM

## 2019-03-07 PROCEDURE — 99213 OFFICE O/P EST LOW 20 MIN: CPT | Mod: PBBFAC | Performed by: NURSE PRACTITIONER

## 2019-03-07 PROCEDURE — 99999 PR PBB SHADOW E&M-EST. PATIENT-LVL III: ICD-10-PCS | Mod: PBBFAC,,, | Performed by: NURSE PRACTITIONER

## 2019-03-07 PROCEDURE — 59430 PR CARE AFTER DELIVERY ONLY: ICD-10-PCS | Mod: S$PBB,,, | Performed by: NURSE PRACTITIONER

## 2019-03-07 PROCEDURE — 99999 PR PBB SHADOW E&M-EST. PATIENT-LVL III: CPT | Mod: PBBFAC,,, | Performed by: NURSE PRACTITIONER

## 2019-03-07 NOTE — PATIENT INSTRUCTIONS
Después de un parto vaginal  Después del nacimiento de carroll bebé, es posible que carroll cuerpo esté muy cansado. Recuperarse de un parto vaginal puede llevar tiempo. Quizás usted permanezca en el hospital o centro de maternidad entre chas y cuatro días. También es posible que pueda regresar a carroll casa el mismo día.    Inmediatamente después del parto  Le tomarán la temperatura y la presión arterial hasta que se le estabilicen. Chris enfermera u otro proveedor de atención médica la observará mientras usted descansa. Quizás tenga pearl del posparto, que son cólicos causados por el encogimiento del útero. Se usan toallas sanitarias para absorber el flujo del revestimiento uterino. Para garantizar que no esté sangrando demasiado, le revisarán la toalla sanitaria. Además, chris enfermera le examinará la firmeza del útero aplicándole chris presión suave en el estómago.  Si le pusieron anestesia, la vigilarán estrechamente hasta que usted pueda sentir y  los dedos de los pies. Si tiene dolor perineal (en la mukund entre la vagina y el ano), podrá aliviárselo con chris compresa de hielo.  El cuidado del recién nacido  Mientras usted se encuentre todavía en el hospital o centro de maternidad, le enseñarán a cargar a carroll bebé en brazos y a alimentarlo. También le darán consejos sobre los cuidados de carroll recién nacido, cómo bañar al bebé e instrucciones para amamantarlo.  Preparativos para regresar a carroll casa  Quizás esté ansiosa por irse a carroll casa lo antes posible. Antes de que la dejen irse con carroll recién nacido, un proveedor de atención médica la examinará para garantizar que usted esté lo suficientemente emre rainer para cuidar de carroll propia evelyn y la de carroll bebé. Estará lista para regresar a casa cuando:  · Pueda caminar hasta el baño y usarlo sin ayuda.  · Pueda comer alimentos sólidos y tragar pastillas (si las necesita).  · No tenga señales de infección ni de otros problemas de evelyn, rainer fiebre.  · Tenga un adecuado control del  dolor.  · Carroll sangrado no sea excesivo.  · Pueda cuidar de carroll recién nacido y esté emocionalmente estable.  Antes de salir del hospital o centro de maternidad, le darán instrucciones sobre cómo cuidar de usted en carroll hogar después de un parto vaginal. Asegúrese de seguir estas instrucciones atentamente. Si tiene preguntas o preocupaciones, hable de eso con carroll proveedor ahora.  Si le pusieron puntos  Es posible que le hayan puesto puntos en la piel cercana a carroll vagina. Los puntos pueden jayy cerrado chris episiotomía (incisión que agranda la abertura de la vagina) o reparado piel desgarrada. De cualquier forma, los puntos deberían disolvérsele en unas cuantas semanas. Hasta sarkis momento, mantenga limpios los puntos para ayudar a aliviar las molestias, facilitar la cicatrización y reducir carroll riesgo de infecciones. Podrían servirle los siguientes consejos:  · Después de orinar o evacuar luc intestinos, límpiese desde adelante hacia atrás.  · Después de limpiarse, rocíese la mukund con agua tibia. O delores, tome un baño de asiento. Volin significa sentarse en chris tyesha que contiene unas cuantas pulgadas de agua tibia. Por último, séquese la mukund con palmaditas o con un secador de pelo en un ajuste de baja temperatura.  · No use jabón ni ninguna solución excepto agua en la mukund.  · Puede ducharse a menos que le digan que no lo chad.  · Cámbiese las toallas sanitarias rainer mínimo cada dos a cuatro horas.  · Aplique compresas frías o calientes en la mukund, según le hayan indicado luc proveedores de atención médica o enfermeras. Póngase chris toalla delgada entre la compresa y la piel.  · Siéntese en asientos firmes para que los puntos se estiren menos.  Visita de control posnatal  Programe chris visita de control posnatal con carroll proveedor de atención médica para unas seis semanas después del parto. Zia brenna examen le examinarán el útero y la mukund vaginal. Póngase en contacto con carroll proveedor de atención médica si piensa que usted o  carroll bebé tienen algún problema.  Cuándo llamar a carroll proveedor de atención médica  Llame inmediatamente a carroll proveedor de atención médica si tiene alguno de los siguientes síntomas:  · Fiebre de 100.4° F (38.0º C) o más tor.  · Sangrado que empapa lisha nueva toalla sanitaria después de lisha hora o que contiene coágulos grandes.  · Dolor en la vagina que empeora y que no se shaun con los medicamentos.  · Enrojecimiento, flujo o más dolor a causa del desgarro vaginal o la episiotomía.  · Ardor, dolor, shell hassan o lisha mukund con nódulos en los senos junto, quizás, con síntomas similares a los de la gripe.  · Grietas, ampollas o ricki en los pezones.  · Ardor o dolor al orinar.  · Náusea o vómito.  · Mareos o desmayos.  · Sensación de tristeza extrema o ansiedad, o falta de ganas de estar con carroll bebé.  · Dolor abdominal que no se shaun con medicamentos.  · Flujo vaginal maloliente.  · Ninguna evacuación de los intestinos en ny días.  · Dolor al orinar o incapacidad de controlar la orina.  · Enrojecimiento, sensación de calor o dolor en la parte inferior de la pierna.  · Dolor en el pecho.      Date Last Reviewed: 8/2/2015  © 8189-7227 [employer] - [place]. [address] [place] [address], [place] [address]. Todos los derechos reservados. Esta información no pretende sustituir la atención médica profesional. Sólo carroll médico puede diagnosticar y tratar un problema de evelyn.        En qué consiste la preeclampsia  La preeclampsia es un trastorno que puede ocurrir janeth el embarazo. Puede provocar riesgos de evelyn para usted y para carroll bebé. La causa de la preeclampsia es desconocida, martine suele desaparecer poco después de home a keli.     Le revisarán la presión arterial con regularidad janeth todo el embarazo para comprobar si presenta síntomas de preeclampsia.   Señales y síntomas  Lisha señal común de preeclampsia es la presión arterial tor. Entre otras señales y síntomas se podrían encontrar los siguientes:  · Aumento  rápido de peso (aproximadamente chris gabriella o medio kilo o más cada día)  · Proteína en la orina  · Dolor de derian  · Dolor abdominal en el lado derecho  · Problemas de visión (destellos o manchas)  · Edema (hinchazón) de carroll gerda o shannon long (esto también ocurre comúnmente cerca del final de los embarazos normales, aun sin preeclampsia)  Pruebas que podría necesitar  Carroll proveedor de atención médica deberá comprobar carroll presión sanguínea janeth todo el embarazo. Si la presión sanguínea sube demasiado, es posible que le aparna las siguientes pruebas:  · Pruebas de orina para comprobar si contiene proteínas  · Análisis de ricki para confirmar la preeclampsia  · Monitorización fetal para asegurar que el bebé se encuentra en buen estado de evelyn  Tratamiento de la preeclampsia  La preeclampsia ian siempre desaparece poco después de home a keli. La única felix es el parto. Hasta sarkis momento, el proveedor de atención médica podrá ayudarle a manejar carroll afección. Si shannon síntomas son moderados, es posible que sea suficiente con que descanse en casa. Si shannon síntomas son graves, tendrá que ser hospitalizada. El tratamiento en el hospital incluye lo siguiente:  · Reposo total en la cama para ayudar a controlar la presión sanguínea  · Administración de magnesio por vía intravenosa janeth el trabajo de parto para prevenir las convulsiones  · Parto inducido o mediante cesárea para ayudarle a home a keli más rápidamente  Cuándo debe llamar a carroll proveedor de atención médica  Llame a carroll proveedor de atención médica si la hinchazón, el aumento de peso u otros síntomas se presentan de forma muy rápida o son graves. Algunos casos de preeclampsia son más graves que otros. Shannon señales y síntomas también pueden cambiar o empeorar a medida que se aproxima la fecha del parto.  ¿Quiénes están en riesgo?  La preeclampsia puede ocurrir en cualquier caty embarazada, martine entre los factores que aumentan carroll riesgo están:  · Embarazos anteriores. La  preeclampsia, el retraso de crecimiento intrauterino, el parto prematuro, el desprendimiento de placenta o la muerte fetal.  · El historial de evelyn de la madre. La diabetes, la presión arterial tor, la obesidad, las enfermedades de los riñones, las enfermedades autoinmunes (rainer el lupus), o un historial familiar de preeclampsia.  · Embarazo actual. El primer embarazo, múltiples fetos, tener más de 40 años o la fertilización in vitro.  Peligros de la preeclampsia  Si se tammy sin tratamiento, la preeclampsia puede causar problemas graves para usted y para carroll bebé. La placenta (el órgano a través del cual se alimenta el bebé) puede separarse de la pared uterina, lo cual puede provocar estrés fetal (el bebé tiene mayor riesgo de problemas de evelyn). O delores es posible que el bebé nazca prematuro o con peso demasiado bajo. La preeclampsia también puede causar los siguientes problemas de evelyn:  · Trastornos renales o daños a otros órganos  · Convulsiones  · Ataques cerebrales  Después del parto  En la mayoría de los casos, la preeclampsia desaparece por sí cecille después del parto. A los pocos días, la presión arterial disminuirá y pronto desaparecerán también los otros síntomas de preeclampsia.  Date Last Reviewed: 9/29/2014  © 9984-2307 [employer]. [address] [place] [address], [place] [address]. Todos los derechos reservados. Esta información no pretende sustituir la atención médica profesional. Sólo carroll médico puede diagnosticar y tratar un problema de evelyn.

## 2019-03-07 NOTE — PROGRESS NOTES
"SUBJECTIVE:  Royer Mars is a 33 y.o.  female who presents for a postpartum BP check. She is 1 week postpartum following a normal spontaneous vaginal delivery. Pregnancy complications: severe preeclampsia w/mag infusion post delivery now on nifedipine daily for BP. The delivery was at 38/5 gestational weeks. Postpartum course has been uncomplicated. She is feeling well, fatigued and that she has no suicidal/homicidal inclination. Baby's course has been uncomplicated. Baby is feeding by breast. Bleeding moderate lochia and changing a hospital pad 2 times a day. Bowel function is normal. Bladder function is normal.     I have fully reviewed the prenatal and intrapartum course.  The following portions of the patient's history were reviewed and updated as appropriate: allergies, current medications, past family history, past     OB History    Para Term  AB Living   4 3 3     3   SAB TAB Ectopic Multiple Live Births         0 3      # Outcome Date GA Lbr Christopher/2nd Weight Sex Delivery Anes PTL Lv   4 Term 19 38w5d  3.09 kg (6 lb 13 oz) F Vag-Spont EPI N RIGOBERTO   3 Term  40w0d  3.402 kg (7 lb 8 oz) F Vag-Spont   RIGOBERTO   2 Term  40w0d  3.345 kg (7 lb 6 oz) F Vag-Spont   RIGOBERTO   1                    ROS:  GENERAL: No fever, chills, fatigability or weight loss.  VULVAR: no pain, no lesions and no itching.  VAGINAL: +soreness, no itching, no discharge, no abnormal bleeding and no lesions.  ABDOMEN: No abdominal pain. Denies nausea. Denies vomiting. No diarrhea. No constipation  BREAST: Denies pain. No lumps. No discharge.  URINARY: No incontinence, no nocturia, no frequency and no dysuria.  CARDIOVASCULAR: No chest pain. No shortness of breath. No leg cramps.  NEUROLOGICAL: No headaches. No vision changes.    OBJECTIVE:   BP (!) 130/90   Pulse 82   Resp 10   Ht 4' 11" (1.499 m)   Wt 63.5 kg (140 lb)   LMP 2018 (Exact Date) The patient is currently breastfeeding.Body mass index is " 28.28 kg/m².  Date of last Pap smear: 9/17/2018    General Appearance: alert, well appearing, in no apparent distress, oriented to person, place and time  Head: normocephalic, atraumatic  Abdomen: benign non-tender, without masses or organomegaly palpable  Heart: normal rate, regular rhythm, normal S1, S2, no murmurs, rubs, clicks or gallops  Lungs: Clear to auscultation bilaterally, respirations equal and unlabored  Pelvic: not examined  Extremities: no redness or tenderness in the calves or thighs, edema 2+, no limitation in range of motion, intact peripheral pulses, Mimi's sign negative bilaterally  Skin: normal coloration and turgor, no rashes  Neurological: alert, oriented, normal speech, no focal findings or movement disorder noted  Psych: full facial expressions, good grooming, good insight, normal perception, normal reasoning, normal speech pattern and content and normal thought patterns          ASSESSMENT:  /90 today, asymptomatic, with improving peripheral edema, neurologically intact, compliant on nifedipine daily    ICD-10-CM ICD-9-CM   1. Routine postpartum follow-up Z39.2 V24.2   2. BP check Z01.30 V81.1       PLAN:    Medication compliance was discussed with the patient.   Medication side effects were discussed.  Reduce salt intake to less than 2 grams per day.  Do not add salt to food at the table.  Reduce or get rid of salt used in cooking.  Limit processed and fast foods.  Read package labels for amount of salt (soduim) in foods.      Anticipatory guidelines discussed and emergency precautions given, verbalized understanding.     Follow up in: 1 week or as needed.

## 2019-06-03 PROBLEM — Z37.9 NORMAL LABOR: Status: RESOLVED | Noted: 2019-02-28 | Resolved: 2019-06-03

## 2021-07-01 ENCOUNTER — PATIENT MESSAGE (OUTPATIENT)
Dept: ADMINISTRATIVE | Facility: OTHER | Age: 36
End: 2021-07-01